# Patient Record
Sex: FEMALE | Race: OTHER | NOT HISPANIC OR LATINO
[De-identification: names, ages, dates, MRNs, and addresses within clinical notes are randomized per-mention and may not be internally consistent; named-entity substitution may affect disease eponyms.]

---

## 2017-11-09 ENCOUNTER — APPOINTMENT (OUTPATIENT)
Dept: OTOLARYNGOLOGY | Facility: CLINIC | Age: 52
End: 2017-11-09
Payer: COMMERCIAL

## 2017-11-09 VITALS
BODY MASS INDEX: 25.71 KG/M2 | HEART RATE: 78 BPM | WEIGHT: 160 LBS | SYSTOLIC BLOOD PRESSURE: 146 MMHG | HEIGHT: 66 IN | OXYGEN SATURATION: 97 % | DIASTOLIC BLOOD PRESSURE: 80 MMHG

## 2017-11-09 DIAGNOSIS — Z86.39 PERSONAL HISTORY OF OTHER ENDOCRINE, NUTRITIONAL AND METABOLIC DISEASE: ICD-10-CM

## 2017-11-09 DIAGNOSIS — Z82.49 FAMILY HISTORY OF ISCHEMIC HEART DISEASE AND OTHER DISEASES OF THE CIRCULATORY SYSTEM: ICD-10-CM

## 2017-11-09 DIAGNOSIS — Z78.9 OTHER SPECIFIED HEALTH STATUS: ICD-10-CM

## 2017-11-09 DIAGNOSIS — H92.09 OTALGIA, UNSPECIFIED EAR: ICD-10-CM

## 2017-11-09 PROCEDURE — 99203 OFFICE O/P NEW LOW 30 MIN: CPT

## 2017-11-14 ENCOUNTER — APPOINTMENT (OUTPATIENT)
Dept: OTOLARYNGOLOGY | Facility: CLINIC | Age: 52
End: 2017-11-14
Payer: COMMERCIAL

## 2017-11-14 VITALS — SYSTOLIC BLOOD PRESSURE: 145 MMHG | HEART RATE: 77 BPM | OXYGEN SATURATION: 98 % | DIASTOLIC BLOOD PRESSURE: 81 MMHG

## 2017-11-14 PROCEDURE — 99213 OFFICE O/P EST LOW 20 MIN: CPT

## 2018-01-24 ENCOUNTER — APPOINTMENT (OUTPATIENT)
Dept: OPHTHALMOLOGY | Facility: CLINIC | Age: 53
End: 2018-01-24
Payer: COMMERCIAL

## 2018-01-24 PROCEDURE — 92004 COMPRE OPH EXAM NEW PT 1/>: CPT

## 2018-01-24 PROCEDURE — 92225: CPT | Mod: RT

## 2019-01-23 ENCOUNTER — APPOINTMENT (OUTPATIENT)
Dept: OPHTHALMOLOGY | Facility: CLINIC | Age: 54
End: 2019-01-23
Payer: COMMERCIAL

## 2019-01-23 PROCEDURE — 92012 INTRM OPH EXAM EST PATIENT: CPT

## 2019-06-07 ENCOUNTER — TRANSCRIPTION ENCOUNTER (OUTPATIENT)
Age: 54
End: 2019-06-07

## 2019-06-14 ENCOUNTER — APPOINTMENT (OUTPATIENT)
Dept: OTOLARYNGOLOGY | Facility: CLINIC | Age: 54
End: 2019-06-14
Payer: COMMERCIAL

## 2019-06-14 VITALS
HEART RATE: 74 BPM | DIASTOLIC BLOOD PRESSURE: 97 MMHG | RESPIRATION RATE: 16 BRPM | SYSTOLIC BLOOD PRESSURE: 156 MMHG | TEMPERATURE: 98.3 F | OXYGEN SATURATION: 98 %

## 2019-06-14 PROCEDURE — 99213 OFFICE O/P EST LOW 20 MIN: CPT

## 2019-06-14 NOTE — HISTORY OF PRESENT ILLNESS
[de-identified] : 55 yo woman with h/o b swimmer's ear - this developed a week ago. it was severe - she went to urgent care and was given cortisporin and she feels significant improvement but still has mild pain l>r. no hl or tinnitus. no qtip use. nonsmoker.

## 2019-06-14 NOTE — PHYSICAL EXAM
[Normal] : external appearance is normal [de-identified] : resolving oe, no debris b,gently suctioned

## 2020-01-29 ENCOUNTER — NON-APPOINTMENT (OUTPATIENT)
Age: 55
End: 2020-01-29

## 2020-01-29 ENCOUNTER — APPOINTMENT (OUTPATIENT)
Dept: OPHTHALMOLOGY | Facility: CLINIC | Age: 55
End: 2020-01-29
Payer: COMMERCIAL

## 2020-01-29 PROCEDURE — 92083 EXTENDED VISUAL FIELD XM: CPT

## 2020-01-29 PROCEDURE — 92133 CPTRZD OPH DX IMG PST SGM ON: CPT

## 2020-01-29 PROCEDURE — 92014 COMPRE OPH EXAM EST PT 1/>: CPT

## 2020-07-28 ENCOUNTER — APPOINTMENT (OUTPATIENT)
Dept: OTOLARYNGOLOGY | Facility: CLINIC | Age: 55
End: 2020-07-28
Payer: COMMERCIAL

## 2020-07-28 VITALS
HEART RATE: 93 BPM | RESPIRATION RATE: 15 BRPM | OXYGEN SATURATION: 97 % | TEMPERATURE: 98.3 F | SYSTOLIC BLOOD PRESSURE: 165 MMHG | DIASTOLIC BLOOD PRESSURE: 99 MMHG

## 2020-07-28 DIAGNOSIS — H60.333 SWIMMER'S EAR, BILATERAL: ICD-10-CM

## 2020-07-28 PROCEDURE — 99214 OFFICE O/P EST MOD 30 MIN: CPT

## 2020-07-28 NOTE — HISTORY OF PRESENT ILLNESS
[de-identified] : 56 yo woman with h/o o.e. in the past c/o r>l otalgia, dull, not itchy for a few d getting worse. -f.s.c she also has h/o r tonsil larger than left that another ent noticed a few yrs ago, she said . Her throat has been getting increasing pain or a few days as well. no sob, no new cough but has had one in the past.

## 2020-08-06 ENCOUNTER — APPOINTMENT (OUTPATIENT)
Dept: OTOLARYNGOLOGY | Facility: CLINIC | Age: 55
End: 2020-08-06
Payer: COMMERCIAL

## 2020-08-06 VITALS
HEART RATE: 74 BPM | OXYGEN SATURATION: 97 % | DIASTOLIC BLOOD PRESSURE: 106 MMHG | SYSTOLIC BLOOD PRESSURE: 163 MMHG | TEMPERATURE: 98.4 F

## 2020-08-06 DIAGNOSIS — J03.90 ACUTE TONSILLITIS, UNSPECIFIED: ICD-10-CM

## 2020-08-06 PROCEDURE — 31575 DIAGNOSTIC LARYNGOSCOPY: CPT

## 2020-08-06 PROCEDURE — 99214 OFFICE O/P EST MOD 30 MIN: CPT | Mod: 25

## 2020-08-06 NOTE — ASSESSMENT
[FreeTextEntry1] : 55F who returns for sore throat and tonsillar hypertrophy, found to have RL on exam.\par \par Plan:\par - CT neck to r/o mass of tonsil\par - Rx Omeprazole\par - f/u 1 week\par - low acid diet modifications discussed- sheet given

## 2020-08-06 NOTE — HISTORY OF PRESENT ILLNESS
[de-identified] : 55F who returns for follow up of right tonsillitis/tonsillar hypertrophy and otalgia. She was prescribed Augmentin and Ciprodex drops with improvement, but not resolution of symptoms. She is now c/o throat irritation and mild dysphagia. she has a mild cough.and hoarseness.nonsmoker

## 2020-08-06 NOTE — REASON FOR VISIT
[Subsequent Evaluation] : a subsequent evaluation for [FreeTextEntry2] : R tonsillar hypertrophy, throat irritation

## 2020-08-06 NOTE — PROCEDURE
[Dysphagia] : dysphagia not clearly evaluated by indirect laryngoscopy [Unable to Cooperate with Mirror] : patient unable to cooperate with mirror [Serial Number: ___] : Serial Number: [unfilled] [Flexible Endoscope] : examined with the flexible endoscope [Interarytenoid Edema] : interarytenoid area edematous [Normal] : the false vocal folds were pink and regular, the ventricular sulcus was open, the true vocal folds were glistening white, tense and of equal length, mobility, and height [de-identified] : Procedure was explained to patient with all risks, benefits and alternatives, all questions answered. Patient was positioned sitting upright with chest out. Bilateral nasal passages inspected with no erythema, edema, polyps, masses, lesions or purulent drainage. Scope was advanced via right nasal passage to posterior nasopharynx where no masses, lesions or drainage was noted. Scope was then advanced to oropharynx where no masses, lesions or obstruction was noted. Scope was then advanced to hypopharynx/larynx where no asymmetry, masses, lesions, pooled secretions, vocal cord dysmotility or stenosis was noted. Patient tolerated procedure well.\par Significant interarytenoid and posterior cricoid edema and erythema consistent with reflux laryngitis.

## 2020-08-13 ENCOUNTER — APPOINTMENT (OUTPATIENT)
Dept: OTOLARYNGOLOGY | Facility: CLINIC | Age: 55
End: 2020-08-13
Payer: COMMERCIAL

## 2020-08-13 VITALS
RESPIRATION RATE: 17 BRPM | DIASTOLIC BLOOD PRESSURE: 110 MMHG | TEMPERATURE: 98.2 F | HEART RATE: 92 BPM | OXYGEN SATURATION: 99 % | SYSTOLIC BLOOD PRESSURE: 160 MMHG

## 2020-08-13 PROCEDURE — 99214 OFFICE O/P EST MOD 30 MIN: CPT

## 2020-08-13 NOTE — REASON FOR VISIT
[Subsequent Evaluation] : a subsequent evaluation for [FreeTextEntry2] : asymmetric tonsils, reflux laryngitis

## 2020-08-13 NOTE — DATA REVIEWED
[de-identified] : ct reviewed with pt and dr toscano- significant r tonsillar asymmetry; 1.8 cm thyroid nodule followed by Dr Cunningham (she said he had had bx)

## 2020-08-13 NOTE — HISTORY OF PRESENT ILLNESS
[de-identified] : followup 56 yo woman found to have r tonsil much larger than left and also reflux - treated with abx diet and mech precautions and omep. she feels about the same. had contrast rxn in radiology, controlled with benadryl. nonsmoker. sx are moderate,.of dysphagia

## 2020-08-18 ENCOUNTER — APPOINTMENT (OUTPATIENT)
Dept: OTOLARYNGOLOGY | Facility: CLINIC | Age: 55
End: 2020-08-18
Payer: COMMERCIAL

## 2020-08-18 VITALS
TEMPERATURE: 97.9 F | SYSTOLIC BLOOD PRESSURE: 170 MMHG | DIASTOLIC BLOOD PRESSURE: 98 MMHG | RESPIRATION RATE: 14 BRPM | BODY MASS INDEX: 25.71 KG/M2 | HEIGHT: 66 IN | HEART RATE: 85 BPM | OXYGEN SATURATION: 97 % | WEIGHT: 160 LBS

## 2020-08-18 DIAGNOSIS — S00.522A BLISTER (NONTHERMAL) OF ORAL CAVITY, INITIAL ENCOUNTER: ICD-10-CM

## 2020-08-18 PROCEDURE — 99214 OFFICE O/P EST MOD 30 MIN: CPT

## 2020-08-18 NOTE — HISTORY OF PRESENT ILLNESS
[de-identified] : followup 56 yo woman with asymmetric tonsils on o.r. scheduled for tonsillectomy next week. She called 3 d ago with blister in mouth. started on valtrex and she reports that itg is much better. -f.s.c

## 2020-08-18 NOTE — PHYSICAL EXAM
[de-identified] : asymmetric r>l [de-identified] : healing blister on roof of mouth [Normal] : no rashes

## 2020-08-23 ENCOUNTER — APPOINTMENT (OUTPATIENT)
Dept: DISASTER EMERGENCY | Facility: CLINIC | Age: 55
End: 2020-08-23

## 2020-09-06 ENCOUNTER — APPOINTMENT (OUTPATIENT)
Dept: DISASTER EMERGENCY | Facility: CLINIC | Age: 55
End: 2020-09-06

## 2020-09-07 LAB — SARS-COV-2 N GENE NPH QL NAA+PROBE: NOT DETECTED

## 2020-09-08 ENCOUNTER — TRANSCRIPTION ENCOUNTER (OUTPATIENT)
Age: 55
End: 2020-09-08

## 2020-09-09 ENCOUNTER — OUTPATIENT (OUTPATIENT)
Dept: OUTPATIENT SERVICES | Facility: HOSPITAL | Age: 55
LOS: 1 days | Discharge: HOME | End: 2020-09-09
Payer: COMMERCIAL

## 2020-09-09 ENCOUNTER — APPOINTMENT (OUTPATIENT)
Dept: OTOLARYNGOLOGY | Facility: AMBULATORY SURGERY CENTER | Age: 55
End: 2020-09-09

## 2020-09-09 ENCOUNTER — RESULT REVIEW (OUTPATIENT)
Age: 55
End: 2020-09-09

## 2020-09-09 DIAGNOSIS — Z98.89 OTHER SPECIFIED POSTPROCEDURAL STATES: Chronic | ICD-10-CM

## 2020-09-09 PROCEDURE — 42826 REMOVAL OF TONSILS: CPT | Mod: GC

## 2020-09-09 PROCEDURE — 88189 FLOWCYTOMETRY/READ 16 & >: CPT

## 2020-09-09 PROCEDURE — 88304 TISSUE EXAM BY PATHOLOGIST: CPT | Mod: 26

## 2020-09-14 ENCOUNTER — APPOINTMENT (OUTPATIENT)
Dept: OTOLARYNGOLOGY | Facility: CLINIC | Age: 55
End: 2020-09-14
Payer: COMMERCIAL

## 2020-09-14 VITALS
OXYGEN SATURATION: 95 % | SYSTOLIC BLOOD PRESSURE: 206 MMHG | HEART RATE: 81 BPM | TEMPERATURE: 98.3 F | DIASTOLIC BLOOD PRESSURE: 107 MMHG

## 2020-09-14 DIAGNOSIS — G89.18 OTHER ACUTE POSTPROCEDURAL PAIN: ICD-10-CM

## 2020-09-14 PROCEDURE — 99024 POSTOP FOLLOW-UP VISIT: CPT

## 2020-09-14 NOTE — HISTORY OF PRESENT ILLNESS
[de-identified] : 54 yo woman 5d s/p tonsillectomy is c/o severe b otalgia. She denies hl or vertigo. Has had no bleeding. Did not take her antihypertensives this am but can swallow her meds. She can take po liquids and soft foods. The otalgia has been present for her entire postop course.

## 2020-09-14 NOTE — PHYSICAL EXAM
[FreeTextEntry1] : in pain [de-identified] : b acute [Removed] : palatine tonsils previously removed [Normal] : no masses and lesions seen, face is symmetric [de-identified] : typical green white eschar in tonsillar beds. no erythema or blood. moist mucosa.

## 2020-09-15 ENCOUNTER — APPOINTMENT (OUTPATIENT)
Dept: OTOLARYNGOLOGY | Facility: CLINIC | Age: 55
End: 2020-09-15

## 2020-09-17 LAB — SURGICAL PATHOLOGY STUDY: SIGNIFICANT CHANGE UP

## 2020-09-18 ENCOUNTER — APPOINTMENT (OUTPATIENT)
Dept: OTOLARYNGOLOGY | Facility: CLINIC | Age: 55
End: 2020-09-18
Payer: COMMERCIAL

## 2020-09-18 VITALS
SYSTOLIC BLOOD PRESSURE: 132 MMHG | HEART RATE: 53 BPM | BODY MASS INDEX: 24.11 KG/M2 | DIASTOLIC BLOOD PRESSURE: 83 MMHG | HEIGHT: 66 IN | WEIGHT: 150 LBS | TEMPERATURE: 97.4 F | OXYGEN SATURATION: 97 %

## 2020-09-18 DIAGNOSIS — M62.838 OTHER MUSCLE SPASM: ICD-10-CM

## 2020-09-18 PROCEDURE — 99024 POSTOP FOLLOW-UP VISIT: CPT

## 2020-09-18 NOTE — PHYSICAL EXAM
[de-identified] : b spasm masseter muscles no crepitance, jaw still [de-identified] : tms normal [Removed] : palatine tonsils previously removed [de-identified] : scabs starting to come off - healing well [de-identified] : moist [Normal] : assessment of respiratory effort is normal

## 2020-09-18 NOTE — HISTORY OF PRESENT ILLNESS
[de-identified] : 9 d postop tonsillectomy.Throat pain improved but ear pain is massive. Denies hl. She explains she has h/o tmj in the past and cannot chew gum or same problem happens. She took percocet at 4 am for the ear pain as it woke her up but has had no pain med since.

## 2020-09-24 ENCOUNTER — APPOINTMENT (OUTPATIENT)
Dept: OTOLARYNGOLOGY | Facility: CLINIC | Age: 55
End: 2020-09-24
Payer: COMMERCIAL

## 2020-09-24 VITALS
RESPIRATION RATE: 98 BRPM | TEMPERATURE: 97.2 F | SYSTOLIC BLOOD PRESSURE: 139 MMHG | OXYGEN SATURATION: 98 % | DIASTOLIC BLOOD PRESSURE: 86 MMHG

## 2020-09-24 DIAGNOSIS — J35.8 OTHER CHRONIC DISEASES OF TONSILS AND ADENOIDS: ICD-10-CM

## 2020-09-24 PROCEDURE — 99024 POSTOP FOLLOW-UP VISIT: CPT

## 2020-09-24 NOTE — PHYSICAL EXAM
[Normal] : mucosa is normal [Midline] : trachea located in midline position [de-identified] : R TM with monomer, otherwise normal [de-identified] : healing palatine tonsil bed

## 2020-09-24 NOTE — REASON FOR VISIT
[Post-Operative Visit] : a post-operative visit [FreeTextEntry2] : postop [FreeTextEntry1] : tonsillectomy

## 2020-09-24 NOTE — ASSESSMENT
[FreeTextEntry1] : 55F who is POD 15 from tonsillectomy for asymmetric tonsils. She is now with improved pain and good PO intake.\par \par Plan:\par - continue pain medications and cyclobenzaprine PRN\par - f/u in 2 weeks\par - continue adequate PO intake and hydration

## 2020-09-24 NOTE — HISTORY OF PRESENT ILLNESS
[de-identified] : pod 15 tonsillectomy - she had discomfort from tmj which has improved with muscle relaxants - she is no longer needing them - throat pain also improved and she is eating a more normal diet\par path benign\par exam - tms eddie, tmjs less inflamed, better excursion, tonsil beds healing well\par reassured rec - continue to increase diet\par rtc 2 weeks

## 2020-09-24 NOTE — HISTORY OF PRESENT ILLNESS
[de-identified] : pod 15 tonsillectomy - she had discomfort from tmj which has improved with muscle relaxants - she is no longer needing them - throat pain also improved and she is eating a more normal diet\par path benign\par exam - tms eddie, tmjs less inflamed, better excursion, tonsil beds healing well\par reassured rec - continue to increase diet\par rtc 2 weeks

## 2020-09-24 NOTE — PHYSICAL EXAM
[Normal] : mucosa is normal [Midline] : trachea located in midline position [de-identified] : R TM with monomer, otherwise normal [de-identified] : healing palatine tonsil bed

## 2020-10-09 ENCOUNTER — APPOINTMENT (OUTPATIENT)
Dept: OTOLARYNGOLOGY | Facility: CLINIC | Age: 55
End: 2020-10-09
Payer: COMMERCIAL

## 2020-10-09 VITALS
SYSTOLIC BLOOD PRESSURE: 160 MMHG | HEART RATE: 71 BPM | DIASTOLIC BLOOD PRESSURE: 88 MMHG | TEMPERATURE: 98.7 F | OXYGEN SATURATION: 97 % | RESPIRATION RATE: 17 BRPM

## 2020-10-09 PROCEDURE — 99024 POSTOP FOLLOW-UP VISIT: CPT

## 2020-10-09 NOTE — PHYSICAL EXAM
[Removed] : palatine tonsils previously removed [Laryngoscopy Performed] : laryngoscopy was performed, see procedure section for findings [Normal] : no rashes [de-identified] : almost completely healed

## 2020-10-09 NOTE — PROCEDURE
[Complicated Symptoms] : complicated symptoms requiring more thorough examination than provided by mirror [Flexible Endoscope] : examined with the flexible endoscope [Normal] : the false vocal folds were pink and regular, the ventricular sulcus was open, the true vocal folds were glistening white, tense and of equal length, mobility, and height [Interarytenoid Edema] : interarytenoid area edematous [Serial Number: ___] : Serial Number: [unfilled] [de-identified] : done for fullness when she swallows - mild iah and bot irritation cw reflux\par postop inflammation

## 2020-10-09 NOTE — HISTORY OF PRESENT ILLNESS
[de-identified] : 56 yo woman 1 mo postop tonsillectomy feels pain in her throat and ears is much better but she still has some fullness in the back of her throat when she swallows. -vikassAugustinac

## 2020-11-13 ENCOUNTER — APPOINTMENT (OUTPATIENT)
Dept: OTOLARYNGOLOGY | Facility: CLINIC | Age: 55
End: 2020-11-13
Payer: COMMERCIAL

## 2020-11-13 VITALS
TEMPERATURE: 98 F | HEART RATE: 66 BPM | SYSTOLIC BLOOD PRESSURE: 171 MMHG | DIASTOLIC BLOOD PRESSURE: 93 MMHG | OXYGEN SATURATION: 98 %

## 2020-11-13 PROCEDURE — 99213 OFFICE O/P EST LOW 20 MIN: CPT | Mod: 24

## 2020-11-13 PROCEDURE — 99072 ADDL SUPL MATRL&STAF TM PHE: CPT

## 2020-11-13 NOTE — PHYSICAL EXAM
[de-identified] : l normal; r scant semisolid debris, yellow suctioned, tm intact looks fungal, tm intact [Normal] : no masses and lesions seen, face is symmetric

## 2020-11-13 NOTE — HISTORY OF PRESENT ILLNESS
[de-identified] : 56 yo woman who had tonsillectomy over 2 mo ago got water in r ear a week ago and has had 5 d of progressive r otalgia and fullness. she has tried ciprodex but has had no improvement. -f.s.c mouth feels fine.

## 2020-11-20 ENCOUNTER — APPOINTMENT (OUTPATIENT)
Dept: OTOLARYNGOLOGY | Facility: CLINIC | Age: 55
End: 2020-11-20
Payer: COMMERCIAL

## 2020-11-20 ENCOUNTER — NON-APPOINTMENT (OUTPATIENT)
Age: 55
End: 2020-11-20

## 2020-11-20 VITALS
SYSTOLIC BLOOD PRESSURE: 170 MMHG | HEART RATE: 70 BPM | OXYGEN SATURATION: 97 % | TEMPERATURE: 96.8 F | DIASTOLIC BLOOD PRESSURE: 98 MMHG

## 2020-11-20 PROCEDURE — 99213 OFFICE O/P EST LOW 20 MIN: CPT | Mod: 24

## 2020-11-20 NOTE — PHYSICAL EXAM
[Normal] : tympanic membranes are normal in both ears [de-identified] : l ok; r improved but stil some white/clear semisolid matl suctioned - tm nl mild inflammation [de-identified] : gait steady

## 2020-11-20 NOTE — HISTORY OF PRESENT ILLNESS
[de-identified] : followup 54 yo woman s/p tonsillectomy approx 3 mo ago found to have otomycosis last visit 1 week ago - she is on vosol and feels much improved but still has some drainage. -fAugustinasAugustinac

## 2020-12-02 ENCOUNTER — LABORATORY RESULT (OUTPATIENT)
Age: 55
End: 2020-12-02

## 2020-12-02 ENCOUNTER — APPOINTMENT (OUTPATIENT)
Dept: OTOLARYNGOLOGY | Facility: CLINIC | Age: 55
End: 2020-12-02
Payer: COMMERCIAL

## 2020-12-02 VITALS
RESPIRATION RATE: 14 BRPM | HEART RATE: 66 BPM | SYSTOLIC BLOOD PRESSURE: 171 MMHG | DIASTOLIC BLOOD PRESSURE: 91 MMHG | TEMPERATURE: 98.3 F | OXYGEN SATURATION: 98 %

## 2020-12-02 PROCEDURE — 99214 OFFICE O/P EST MOD 30 MIN: CPT | Mod: 24

## 2020-12-02 PROCEDURE — 99072 ADDL SUPL MATRL&STAF TM PHE: CPT

## 2020-12-02 PROCEDURE — 92550 TYMPANOMETRY & REFLEX THRESH: CPT

## 2020-12-02 PROCEDURE — 92557 COMPREHENSIVE HEARING TEST: CPT

## 2020-12-02 NOTE — PHYSICAL EXAM
[de-identified] : r yellow debris suctioned atraumatically/little induration/no purulence/ps perf seen - cultured; l normal [Normal] : no rashes

## 2020-12-02 NOTE — HISTORY OF PRESENT ILLNESS
[de-identified] : 56 yo woman seen 12 d ago with r mild otomycosis and started on acetic acid drops could not followup till this week - she reports worsened r ear pain with drops ofor the past 3 days and poor r hearing. -f/.s/.c. She is keeping it dry. No qtips.

## 2020-12-09 ENCOUNTER — APPOINTMENT (OUTPATIENT)
Dept: OTOLARYNGOLOGY | Facility: CLINIC | Age: 55
End: 2020-12-09
Payer: COMMERCIAL

## 2020-12-09 VITALS
SYSTOLIC BLOOD PRESSURE: 149 MMHG | TEMPERATURE: 97.2 F | DIASTOLIC BLOOD PRESSURE: 88 MMHG | HEART RATE: 78 BPM | OXYGEN SATURATION: 98 %

## 2020-12-09 DIAGNOSIS — H62.41 SUPERFICIAL MYCOSIS, UNSPECIFIED: ICD-10-CM

## 2020-12-09 DIAGNOSIS — B36.9 SUPERFICIAL MYCOSIS, UNSPECIFIED: ICD-10-CM

## 2020-12-09 PROCEDURE — 99072 ADDL SUPL MATRL&STAF TM PHE: CPT

## 2020-12-09 PROCEDURE — 99213 OFFICE O/P EST LOW 20 MIN: CPT

## 2020-12-09 NOTE — PHYSICAL EXAM
[de-identified] : l nl; r persistent perf, barely any white yellow drainage recultured under microscope [Normal] : no masses and lesions seen, face is symmetric [de-identified] : gait steady

## 2020-12-09 NOTE — HISTORY OF PRESENT ILLNESS
[de-identified] : Followup 56 yo woman with r probable otomycosis and ear drainage. Culture has grown only s.epi. On bactrim, voriconazole she has less pain and drainage but hearing is still poor in the ear. -f.s.c She never got clotrimazole drops because pharmacy did not have them. Denies vertigo. She feels hearing has not changed from last week.

## 2020-12-18 ENCOUNTER — APPOINTMENT (OUTPATIENT)
Dept: OTOLARYNGOLOGY | Facility: CLINIC | Age: 55
End: 2020-12-18
Payer: COMMERCIAL

## 2020-12-18 VITALS
OXYGEN SATURATION: 98 % | SYSTOLIC BLOOD PRESSURE: 162 MMHG | HEART RATE: 78 BPM | DIASTOLIC BLOOD PRESSURE: 97 MMHG | TEMPERATURE: 98.1 F

## 2020-12-18 DIAGNOSIS — B36.9 SUPERFICIAL MYCOSIS, UNSPECIFIED: ICD-10-CM

## 2020-12-18 DIAGNOSIS — H62.41 SUPERFICIAL MYCOSIS, UNSPECIFIED: ICD-10-CM

## 2020-12-18 LAB — BACTERIA FLD CULT: ABNORMAL

## 2020-12-18 PROCEDURE — 99072 ADDL SUPL MATRL&STAF TM PHE: CPT

## 2020-12-18 PROCEDURE — 99213 OFFICE O/P EST LOW 20 MIN: CPT

## 2020-12-18 NOTE — ASSESSMENT
[FreeTextEntry1] : cold sores of lips- resolved on valtrex\par \par otomycosis - resolved\par \par tm perf - persistent - continue to observe for healing\par dep\par rtc 3-4 weeks\par she understands surgery to repair this may be recommended if it doesn’t heal

## 2020-12-18 NOTE — HISTORY OF PRESENT ILLNESS
[de-identified] : followup r otomycosis - on voriconazole, bactrim and clotrimazole drops she feels no more pain and minimal drainage. -f.s.c hearing is still poor - she feels sh has moderate hearing loss. She has almost finished her meds.

## 2020-12-23 LAB — SARS-COV-2 N GENE NPH QL NAA+PROBE: NOT DETECTED

## 2021-01-08 ENCOUNTER — APPOINTMENT (OUTPATIENT)
Dept: OTOLARYNGOLOGY | Facility: CLINIC | Age: 56
End: 2021-01-08
Payer: COMMERCIAL

## 2021-01-08 VITALS
HEART RATE: 69 BPM | SYSTOLIC BLOOD PRESSURE: 131 MMHG | OXYGEN SATURATION: 97 % | TEMPERATURE: 98 F | DIASTOLIC BLOOD PRESSURE: 81 MMHG

## 2021-01-08 PROCEDURE — 99072 ADDL SUPL MATRL&STAF TM PHE: CPT

## 2021-01-08 PROCEDURE — 99214 OFFICE O/P EST MOD 30 MIN: CPT

## 2021-01-08 NOTE — HISTORY OF PRESENT ILLNESS
[de-identified] : followup 56 yo woman with h/o r otomycosis and resultant tm perforation. Infection has resolved for over 3 weeks and she is here to recheck her ear to see if tm perforation has improved or resolved. She c/o continued r aural fullness and subjective severe hl without pain or drainage.

## 2021-01-15 ENCOUNTER — APPOINTMENT (OUTPATIENT)
Dept: OTOLARYNGOLOGY | Facility: CLINIC | Age: 56
End: 2021-01-15
Payer: COMMERCIAL

## 2021-01-15 VITALS
DIASTOLIC BLOOD PRESSURE: 83 MMHG | HEART RATE: 71 BPM | OXYGEN SATURATION: 98 % | SYSTOLIC BLOOD PRESSURE: 147 MMHG | TEMPERATURE: 97.3 F

## 2021-01-15 DIAGNOSIS — H72.01 CENTRAL PERFORATION OF TYMPANIC MEMBRANE, RIGHT EAR: ICD-10-CM

## 2021-01-15 DIAGNOSIS — H90.71 MIXED CONDUCTIVE AND SENSORINEURAL HEARING LOSS, UNILATERAL, RIGHT EAR, WITH UNRESTRICTED HEARING ON THE CONTRALATERAL SIDE: ICD-10-CM

## 2021-01-15 PROCEDURE — 99072 ADDL SUPL MATRL&STAF TM PHE: CPT

## 2021-01-15 PROCEDURE — 99214 OFFICE O/P EST MOD 30 MIN: CPT

## 2021-01-15 NOTE — DATA REVIEWED
[de-identified] :  reviewed with pt from last mo r mhl almost all conductive asymmetry [de-identified] : ct images and report reviewed with pt - clear x tmj and mild sinus congestion

## 2021-01-15 NOTE — HISTORY OF PRESENT ILLNESS
[de-identified] : followup 56 yo woman with r tm perforation- she reports that her hearing is still decreased. Denies pain or drainage. Occurred with otomycosis. Has been present without infection for just under a month. She has had ct and is here to review it together.

## 2021-01-15 NOTE — ASSESSMENT
[FreeTextEntry1] : r tm perf and chl aftr otomycosis\par discussed risks benefits and alts to r tympanoplasty\par she wished to proceed\par brought to asst to schedule surgery

## 2021-01-29 ENCOUNTER — LABORATORY RESULT (OUTPATIENT)
Age: 56
End: 2021-01-29

## 2021-01-30 ENCOUNTER — APPOINTMENT (OUTPATIENT)
Dept: DISASTER EMERGENCY | Facility: CLINIC | Age: 56
End: 2021-01-30

## 2021-01-31 LAB — SARS-COV-2 N GENE NPH QL NAA+PROBE: NOT DETECTED

## 2021-02-01 ENCOUNTER — TRANSCRIPTION ENCOUNTER (OUTPATIENT)
Age: 56
End: 2021-02-01

## 2021-02-02 ENCOUNTER — APPOINTMENT (OUTPATIENT)
Dept: OTOLARYNGOLOGY | Facility: AMBULATORY SURGERY CENTER | Age: 56
End: 2021-02-02

## 2021-02-02 ENCOUNTER — OUTPATIENT (OUTPATIENT)
Dept: OUTPATIENT SERVICES | Facility: HOSPITAL | Age: 56
LOS: 1 days | Discharge: HOME | End: 2021-02-02
Payer: COMMERCIAL

## 2021-02-02 DIAGNOSIS — Z98.89 OTHER SPECIFIED POSTPROCEDURAL STATES: Chronic | ICD-10-CM

## 2021-02-02 PROCEDURE — 69631 REPAIR EARDRUM STRUCTURES: CPT | Mod: RT

## 2021-02-04 ENCOUNTER — APPOINTMENT (OUTPATIENT)
Dept: OTOLARYNGOLOGY | Facility: CLINIC | Age: 56
End: 2021-02-04
Payer: COMMERCIAL

## 2021-02-04 VITALS
OXYGEN SATURATION: 98 % | TEMPERATURE: 97.6 F | RESPIRATION RATE: 15 BRPM | DIASTOLIC BLOOD PRESSURE: 91 MMHG | SYSTOLIC BLOOD PRESSURE: 187 MMHG | HEART RATE: 69 BPM

## 2021-02-04 PROCEDURE — 99024 POSTOP FOLLOW-UP VISIT: CPT

## 2021-02-04 NOTE — HISTORY OF PRESENT ILLNESS
[de-identified] : 56 yo woman 2 d postop r tympanoplasty with postauricular incision. She had mastoid dsg placed at the time of surgery due to mild oozing and is here for removal\par Aside from pressure from the dressing she has no complaints\par dressing removed - forehead fine - incision clean and dry no hematoma  or drainage\par eac checked ointment in canal\par doing well\par cotton ball placed\par postop instructions reviewed\par gait steady, no nystagmus facial function normal\par rtc 5 d to check ear

## 2021-02-10 ENCOUNTER — APPOINTMENT (OUTPATIENT)
Dept: OTOLARYNGOLOGY | Facility: CLINIC | Age: 56
End: 2021-02-10
Payer: COMMERCIAL

## 2021-02-10 VITALS
OXYGEN SATURATION: 98 % | TEMPERATURE: 98.3 F | SYSTOLIC BLOOD PRESSURE: 166 MMHG | HEART RATE: 69 BPM | DIASTOLIC BLOOD PRESSURE: 88 MMHG

## 2021-02-10 PROCEDURE — 99024 POSTOP FOLLOW-UP VISIT: CPT

## 2021-02-10 NOTE — PHYSICAL EXAM
[Normal] : normal appearance, well groomed, well nourished, and in no acute distress [de-identified] : b [de-identified] : r lateral clot in canal removed with suction; l normal

## 2021-02-10 NOTE — HISTORY OF PRESENT ILLNESS
[de-identified] : 57 yo F pod 8 from r tympanoplasty c/o sharp r>l preauricular pain and ear numbness. No significant bleeding ear still feels plugged. She has tmj and is using mouth guard but it hurts to open mouth.

## 2021-02-12 ENCOUNTER — APPOINTMENT (OUTPATIENT)
Dept: OTOLARYNGOLOGY | Facility: CLINIC | Age: 56
End: 2021-02-12
Payer: COMMERCIAL

## 2021-02-12 VITALS
SYSTOLIC BLOOD PRESSURE: 174 MMHG | TEMPERATURE: 98 F | DIASTOLIC BLOOD PRESSURE: 94 MMHG | OXYGEN SATURATION: 97 % | HEART RATE: 63 BPM

## 2021-02-12 PROCEDURE — 99024 POSTOP FOLLOW-UP VISIT: CPT

## 2021-02-12 NOTE — HISTORY OF PRESENT ILLNESS
[de-identified] : followup 57 yo woman 10 d s/p r tympanoplasty c/o severe sharp r preauricular pain aggravated by opening and closing mouth. denies drainage. was here last 2 d ago - this was happening then and thought to be tmj. -stephaniac

## 2021-02-12 NOTE — ASSESSMENT
[FreeTextEntry1] : severe otalgia\par looks like combination of tmj and recent surgery on the same side\par I recommended soft food avoid bruxism she is using appliance - motrin\par she requested vicodin rf - I ordered 8 tabs (after checking Istop) but told her motriin should work better and explained if she needs more I would like to involve pain mgmt\par rtc 2 weeks\par asked to call sooner for any problems

## 2021-02-12 NOTE — PHYSICAL EXAM
[de-identified] : r>>l [de-identified] : l normal; r eac open tm intact not inflmaed healing well -  [Normal] : no rashes

## 2021-02-23 ENCOUNTER — APPOINTMENT (OUTPATIENT)
Dept: OTOLARYNGOLOGY | Facility: CLINIC | Age: 56
End: 2021-02-23
Payer: COMMERCIAL

## 2021-02-23 VITALS
TEMPERATURE: 97.5 F | BODY MASS INDEX: 24.91 KG/M2 | HEART RATE: 67 BPM | HEIGHT: 66 IN | OXYGEN SATURATION: 97 % | WEIGHT: 155 LBS | SYSTOLIC BLOOD PRESSURE: 157 MMHG | DIASTOLIC BLOOD PRESSURE: 87 MMHG

## 2021-02-23 PROCEDURE — 92557 COMPREHENSIVE HEARING TEST: CPT

## 2021-02-23 PROCEDURE — 99024 POSTOP FOLLOW-UP VISIT: CPT

## 2021-02-23 PROCEDURE — 99072 ADDL SUPL MATRL&STAF TM PHE: CPT

## 2021-02-23 NOTE — HISTORY OF PRESENT ILLNESS
[de-identified] : 55 yo woman 3 weeks postop r tympanoplasty\par pain better\par she is unsure of her hearing\par healing well\par tmj better\par scant crusts removed atraumatically with suction and alligator forceps under microscope\par tm well healed\par  huge improvement\par reassured\par recheck 1 mo\par full activity\par questions answered

## 2021-03-23 ENCOUNTER — APPOINTMENT (OUTPATIENT)
Dept: OTOLARYNGOLOGY | Facility: CLINIC | Age: 56
End: 2021-03-23
Payer: COMMERCIAL

## 2021-03-23 VITALS
DIASTOLIC BLOOD PRESSURE: 95 MMHG | HEART RATE: 76 BPM | OXYGEN SATURATION: 98 % | SYSTOLIC BLOOD PRESSURE: 164 MMHG | TEMPERATURE: 98 F

## 2021-03-23 PROCEDURE — 99024 POSTOP FOLLOW-UP VISIT: CPT

## 2021-03-23 NOTE — ASSESSMENT
[FreeTextEntry1] : reassured her exam is normal\par the numbness she c/o seems more like some persistent taste change. I explained the chances are excellent this will resolve or get much better and reassured her. she is not biting anika and feels tongue blade\par nasal and anterior throat exams nl as well\par reassured\par rtc as needed

## 2021-03-23 NOTE — HISTORY OF PRESENT ILLNESS
[de-identified] : 57 yo woman approx 6 weeks s/p r tymp and also s/p tonsillectomy last yr feels better. throat not bothering her and neither is ear. Hearing is good. She c/o some persistent "numbness" to r tongue but it is improved\par

## 2021-05-05 ENCOUNTER — APPOINTMENT (OUTPATIENT)
Dept: OTOLARYNGOLOGY | Facility: CLINIC | Age: 56
End: 2021-05-05
Payer: COMMERCIAL

## 2021-05-05 VITALS
SYSTOLIC BLOOD PRESSURE: 167 MMHG | DIASTOLIC BLOOD PRESSURE: 83 MMHG | TEMPERATURE: 97.4 F | HEART RATE: 91 BPM | RESPIRATION RATE: 17 BRPM | OXYGEN SATURATION: 100 %

## 2021-05-05 DIAGNOSIS — H69.80 OTHER SPECIFIED DISORDERS OF EUSTACHIAN TUBE, UNSPECIFIED EAR: ICD-10-CM

## 2021-05-05 DIAGNOSIS — M54.2 CERVICALGIA: ICD-10-CM

## 2021-05-05 PROCEDURE — 99213 OFFICE O/P EST LOW 20 MIN: CPT | Mod: 25

## 2021-05-05 PROCEDURE — 99072 ADDL SUPL MATRL&STAF TM PHE: CPT

## 2021-05-05 PROCEDURE — 31231 NASAL ENDOSCOPY DX: CPT

## 2021-05-05 PROCEDURE — 69210 REMOVE IMPACTED EAR WAX UNI: CPT

## 2021-05-05 NOTE — ASSESSMENT
[FreeTextEntry1] : cerumen rmeoved \par fullness improved but pain persisted\par she has tmj noticeable\par scope clear\par soft diet nsaids if safe, see her dentist for mouth guard, avoid bruxism\par rtc as needed

## 2021-05-05 NOTE — HISTORY OF PRESENT ILLNESS
[de-identified] : 57 yo woman s/p r tympanoplasty over 3 mo ago and tonsillectomy last yr has had a few d of r otalgia after her second COVID 19 vaccine- it radiates to the r side of her neck and she reports r hl. denies vertigo or tinnitus. denies hl. nonsmoker.

## 2021-05-05 NOTE — PHYSICAL EXAM
[de-identified] : b [de-identified] : r copious cerumen imapction remvoed atraumatically with suction - tm normal; l normal [Normal] : no rashes [de-identified] : gait steady

## 2021-05-05 NOTE — PROCEDURE
[Flexible Endoscope] : examined with the flexible endoscope [Serial Number: ___] : Serial Number: [unfilled] [Normal] : the paranasal sinuses had no abnormalities [Cerumen Impaction] : Cerumen Impaction [Same] : same as the Pre Op Dx. [] : Removal of Cerumen [FreeTextEntry6] : r copious cerumen impaction removed atraumatically with suction

## 2021-05-19 ENCOUNTER — NON-APPOINTMENT (OUTPATIENT)
Age: 56
End: 2021-05-19

## 2021-05-19 ENCOUNTER — APPOINTMENT (OUTPATIENT)
Dept: OPHTHALMOLOGY | Facility: CLINIC | Age: 56
End: 2021-05-19
Payer: COMMERCIAL

## 2021-05-19 PROCEDURE — 92083 EXTENDED VISUAL FIELD XM: CPT

## 2021-05-19 PROCEDURE — 92133 CPTRZD OPH DX IMG PST SGM ON: CPT

## 2021-05-19 PROCEDURE — 99072 ADDL SUPL MATRL&STAF TM PHE: CPT

## 2021-05-19 PROCEDURE — 92014 COMPRE OPH EXAM EST PT 1/>: CPT

## 2022-04-14 ENCOUNTER — APPOINTMENT (OUTPATIENT)
Dept: OTOLARYNGOLOGY | Facility: CLINIC | Age: 57
End: 2022-04-14
Payer: COMMERCIAL

## 2022-04-14 VITALS
HEIGHT: 66 IN | DIASTOLIC BLOOD PRESSURE: 75 MMHG | BODY MASS INDEX: 24.91 KG/M2 | RESPIRATION RATE: 16 BRPM | OXYGEN SATURATION: 99 % | HEART RATE: 60 BPM | SYSTOLIC BLOOD PRESSURE: 168 MMHG | TEMPERATURE: 97.6 F | WEIGHT: 155 LBS

## 2022-04-14 DIAGNOSIS — H93.90 UNSPECIFIED DISORDER OF EAR, UNSPECIFIED EAR: ICD-10-CM

## 2022-04-14 DIAGNOSIS — L30.9 DERMATITIS, UNSPECIFIED: ICD-10-CM

## 2022-04-14 DIAGNOSIS — H61.21 IMPACTED CERUMEN, RIGHT EAR: ICD-10-CM

## 2022-04-14 PROCEDURE — 99213 OFFICE O/P EST LOW 20 MIN: CPT | Mod: 25

## 2022-04-14 PROCEDURE — 69210 REMOVE IMPACTED EAR WAX UNI: CPT

## 2022-04-14 NOTE — PROCEDURE
[Cerumen Impaction] : Cerumen Impaction [Same] : same as the Pre Op Dx. [] : Removal of Cerumen [FreeTextEntry5] : L ok, R copious cerumen removed atraumatically with suction, TMs intact

## 2022-04-14 NOTE — PHYSICAL EXAM
[de-identified] : R postauricular crusting around surgical scarcleaned wih alcohol [de-identified] : L ok, R copious cerumen removed atraumatically with suction, TMs intact  [Normal] : mucosa is normal [Removed] : palatine tonsils previously removed [de-identified] : gait steady

## 2022-04-14 NOTE — ASSESSMENT
[FreeTextEntry1] : 1. R postauricular dermatitis likely from mask and glasses; yellow discharge\par -cleaned with alcohol swab and put bacitracin over area\par -Bactrim- counseled on risk of sun sensitivity/rash and asked to stop taking immediately if this develops- pt understands \par -Mupirocin TID- instructed how to use\par -given tie around masks to avoid irritation\par -rec followup with ophthalmologist to check eyes and for patient to keep glasses off for next 2 weeks\par 2. R cerumen impaction \par -cerumen removed\par -ears feel better\par -Avoid Qtip usage\par RTC 2 weeks to recheck postauricular area

## 2022-04-14 NOTE — REASON FOR VISIT
[Subsequent Evaluation] : a subsequent evaluation for [FreeTextEntry2] : otalgia, aural fullness, postauricular discomfort

## 2022-04-14 NOTE — HISTORY OF PRESENT ILLNESS
[de-identified] : 11 month followup for this 58 y/o F s/p r tympanoplasty over 1 year ago presenting today with R aural fullness. She feels it is "achey" and clogged for the past 2 weeks. It has been intermittently painful, about a 6-7/10 in severity at its worst. It is more bothersome when she lays on R side and she has woken up from sleep due to this. She does have h/o TMJ. She wears a mouth guard at night. For the past two days she notices the back of her ear is wet and itchy. Denies fevers, chills, sweats. No other ENT complaints at this time.

## 2022-05-18 ENCOUNTER — NON-APPOINTMENT (OUTPATIENT)
Age: 57
End: 2022-05-18

## 2022-05-18 ENCOUNTER — APPOINTMENT (OUTPATIENT)
Dept: OPHTHALMOLOGY | Facility: CLINIC | Age: 57
End: 2022-05-18
Payer: COMMERCIAL

## 2022-05-18 PROCEDURE — 92012 INTRM OPH EXAM EST PATIENT: CPT

## 2023-05-24 ENCOUNTER — NON-APPOINTMENT (OUTPATIENT)
Age: 58
End: 2023-05-24

## 2023-05-24 ENCOUNTER — APPOINTMENT (OUTPATIENT)
Dept: OPHTHALMOLOGY | Facility: CLINIC | Age: 58
End: 2023-05-24
Payer: COMMERCIAL

## 2023-05-24 PROCEDURE — 92083 EXTENDED VISUAL FIELD XM: CPT

## 2023-05-24 PROCEDURE — 92133 CPTRZD OPH DX IMG PST SGM ON: CPT

## 2023-05-24 PROCEDURE — 92012 INTRM OPH EXAM EST PATIENT: CPT

## 2023-06-29 ENCOUNTER — NON-APPOINTMENT (OUTPATIENT)
Age: 58
End: 2023-06-29

## 2023-06-29 ENCOUNTER — APPOINTMENT (OUTPATIENT)
Dept: OTOLARYNGOLOGY | Facility: CLINIC | Age: 58
End: 2023-06-29
Payer: COMMERCIAL

## 2023-06-29 ENCOUNTER — RX RENEWAL (OUTPATIENT)
Age: 58
End: 2023-06-29

## 2023-06-29 ENCOUNTER — APPOINTMENT (OUTPATIENT)
Dept: INTERNAL MEDICINE | Facility: CLINIC | Age: 58
End: 2023-06-29
Payer: COMMERCIAL

## 2023-06-29 VITALS
OXYGEN SATURATION: 98 % | DIASTOLIC BLOOD PRESSURE: 75 MMHG | SYSTOLIC BLOOD PRESSURE: 155 MMHG | BODY MASS INDEX: 29.57 KG/M2 | HEIGHT: 66 IN | HEART RATE: 58 BPM | WEIGHT: 184 LBS | TEMPERATURE: 97.6 F

## 2023-06-29 VITALS
HEART RATE: 67 BPM | HEIGHT: 66 IN | OXYGEN SATURATION: 98 % | DIASTOLIC BLOOD PRESSURE: 85 MMHG | WEIGHT: 182 LBS | TEMPERATURE: 98.2 F | RESPIRATION RATE: 16 BRPM | BODY MASS INDEX: 29.25 KG/M2 | SYSTOLIC BLOOD PRESSURE: 170 MMHG

## 2023-06-29 DIAGNOSIS — H92.01 OTALGIA, RIGHT EAR: ICD-10-CM

## 2023-06-29 DIAGNOSIS — Z09 ENCOUNTER FOR FOLLOW-UP EXAMINATION AFTER COMPLETED TREATMENT FOR CONDITIONS OTHER THAN MALIGNANT NEOPLASM: ICD-10-CM

## 2023-06-29 DIAGNOSIS — Z80.3 FAMILY HISTORY OF MALIGNANT NEOPLASM OF BREAST: ICD-10-CM

## 2023-06-29 DIAGNOSIS — J03.90 ACUTE TONSILLITIS, UNSPECIFIED: ICD-10-CM

## 2023-06-29 DIAGNOSIS — R35.0 FREQUENCY OF MICTURITION: ICD-10-CM

## 2023-06-29 DIAGNOSIS — R00.2 PALPITATIONS: ICD-10-CM

## 2023-06-29 DIAGNOSIS — H60.331 SWIMMER'S EAR, RIGHT EAR: ICD-10-CM

## 2023-06-29 DIAGNOSIS — Z01.818 ENCOUNTER FOR OTHER PREPROCEDURAL EXAMINATION: ICD-10-CM

## 2023-06-29 DIAGNOSIS — K21.9 ACUTE LARYNGITIS: ICD-10-CM

## 2023-06-29 DIAGNOSIS — H61.23 IMPACTED CERUMEN, BILATERAL: ICD-10-CM

## 2023-06-29 DIAGNOSIS — Z00.00 ENCOUNTER FOR GENERAL ADULT MEDICAL EXAMINATION W/OUT ABNORMAL FINDINGS: ICD-10-CM

## 2023-06-29 DIAGNOSIS — N80.9 ENDOMETRIOSIS, UNSPECIFIED: ICD-10-CM

## 2023-06-29 DIAGNOSIS — R49.8 OTHER VOICE AND RESONANCE DISORDERS: ICD-10-CM

## 2023-06-29 DIAGNOSIS — M54.2 CERVICALGIA: ICD-10-CM

## 2023-06-29 DIAGNOSIS — J04.0 ACUTE LARYNGITIS: ICD-10-CM

## 2023-06-29 PROCEDURE — 99213 OFFICE O/P EST LOW 20 MIN: CPT | Mod: 25

## 2023-06-29 PROCEDURE — 31575 DIAGNOSTIC LARYNGOSCOPY: CPT

## 2023-06-29 PROCEDURE — 69210 REMOVE IMPACTED EAR WAX UNI: CPT

## 2023-06-29 PROCEDURE — 36415 COLL VENOUS BLD VENIPUNCTURE: CPT

## 2023-06-29 PROCEDURE — 99386 PREV VISIT NEW AGE 40-64: CPT | Mod: 25

## 2023-06-29 RX ORDER — DOCUSATE SODIUM 100 MG/1
100 CAPSULE, LIQUID FILLED ORAL TWICE DAILY
Qty: 42 | Refills: 0 | Status: COMPLETED | COMMUNITY
Start: 2021-01-31 | End: 2023-06-29

## 2023-06-29 RX ORDER — CEFUROXIME AXETIL 500 MG/1
500 TABLET ORAL
Qty: 14 | Refills: 0 | Status: COMPLETED | COMMUNITY
Start: 2021-01-31 | End: 2023-06-29

## 2023-06-29 RX ORDER — SULFAMETHOXAZOLE AND TRIMETHOPRIM 800; 160 MG/1; MG/1
800-160 TABLET ORAL TWICE DAILY
Qty: 20 | Refills: 0 | Status: COMPLETED | COMMUNITY
Start: 2022-04-14 | End: 2023-06-29

## 2023-06-29 RX ORDER — MUPIROCIN 20 MG/G
2 OINTMENT TOPICAL 3 TIMES DAILY
Qty: 1 | Refills: 1 | Status: COMPLETED | COMMUNITY
Start: 2022-04-14 | End: 2023-06-29

## 2023-06-29 RX ORDER — VALACYCLOVIR 1 G/1
1 TABLET, FILM COATED ORAL 3 TIMES DAILY
Qty: 30 | Refills: 1 | Status: COMPLETED | COMMUNITY
Start: 2020-08-15 | End: 2023-06-29

## 2023-06-29 RX ORDER — SULFAMETHOXAZOLE AND TRIMETHOPRIM 800; 160 MG/1; MG/1
800-160 TABLET ORAL
Qty: 20 | Refills: 0 | Status: COMPLETED | COMMUNITY
Start: 2020-12-02 | End: 2023-06-29

## 2023-06-29 RX ORDER — AMOXICILLIN 500 MG/1
500 CAPSULE ORAL
Qty: 18 | Refills: 0 | Status: COMPLETED | COMMUNITY
Start: 2017-08-27 | End: 2023-06-29

## 2023-06-29 RX ORDER — OXYCODONE AND ACETAMINOPHEN 5; 325 MG/1; MG/1
5-325 TABLET ORAL
Qty: 20 | Refills: 0 | Status: COMPLETED | COMMUNITY
Start: 2020-09-14 | End: 2023-06-29

## 2023-06-29 RX ORDER — CYCLOBENZAPRINE HYDROCHLORIDE 5 MG/1
5 TABLET, FILM COATED ORAL 3 TIMES DAILY
Qty: 9 | Refills: 0 | Status: COMPLETED | COMMUNITY
Start: 2020-09-18 | End: 2023-06-29

## 2023-06-29 RX ORDER — VALACYCLOVIR 1 G/1
1 TABLET, FILM COATED ORAL 3 TIMES DAILY
Qty: 30 | Refills: 1 | Status: COMPLETED | COMMUNITY
Start: 2020-12-14 | End: 2023-06-29

## 2023-06-29 RX ORDER — CIPROFLOXACIN AND DEXAMETHASONE 3; 1 MG/ML; MG/ML
0.3-0.1 SUSPENSION/ DROPS AURICULAR (OTIC) TWICE DAILY
Qty: 1 | Refills: 1 | Status: COMPLETED | COMMUNITY
Start: 2017-11-09 | End: 2023-06-29

## 2023-06-29 RX ORDER — CIPROFLOXACIN AND DEXAMETHASONE 3; 1 MG/ML; MG/ML
0.3-0.1 SUSPENSION/ DROPS AURICULAR (OTIC) TWICE DAILY
Qty: 2 | Refills: 3 | Status: COMPLETED | COMMUNITY
Start: 2020-07-28 | End: 2023-06-29

## 2023-06-29 RX ORDER — OMEPRAZOLE 40 MG/1
40 CAPSULE, DELAYED RELEASE ORAL
Qty: 90 | Refills: 3 | Status: COMPLETED | COMMUNITY
Start: 2020-08-06 | End: 2023-06-29

## 2023-06-29 RX ORDER — ACETIC ACID 20 MG/ML
2 SOLUTION AURICULAR (OTIC) DAILY
Qty: 1 | Refills: 0 | Status: COMPLETED | COMMUNITY
Start: 2020-11-13 | End: 2023-06-29

## 2023-06-29 RX ORDER — HYDROCODONE BITARTRATE AND ACETAMINOPHEN 5; 325 MG/1; MG/1
5-325 TABLET ORAL
Qty: 15 | Refills: 0 | Status: COMPLETED | COMMUNITY
Start: 2021-01-31 | End: 2023-06-29

## 2023-06-29 RX ORDER — METOPROLOL TARTRATE 25 MG/1
25 TABLET, FILM COATED ORAL
Qty: 60 | Refills: 0 | Status: COMPLETED | COMMUNITY
Start: 2017-11-03 | End: 2023-06-29

## 2023-06-29 RX ORDER — AMOXICILLIN AND CLAVULANATE POTASSIUM 875; 125 MG/1; MG/1
875-125 TABLET, COATED ORAL
Qty: 20 | Refills: 0 | Status: COMPLETED | COMMUNITY
Start: 2020-07-28 | End: 2023-06-29

## 2023-06-29 RX ORDER — VORICONAZOLE 200 MG/1
200 TABLET ORAL
Qty: 28 | Refills: 2 | Status: COMPLETED | COMMUNITY
Start: 2020-12-02 | End: 2023-06-29

## 2023-06-29 RX ORDER — CLOTRIMAZOLE 10 MG/ML
1 SOLUTION TOPICAL
Qty: 1 | Refills: 1 | Status: COMPLETED | COMMUNITY
Start: 2020-12-02 | End: 2023-06-29

## 2023-06-29 RX ORDER — SULFAMETHOXAZOLE AND TRIMETHOPRIM 800; 160 MG/1; MG/1
800-160 TABLET ORAL
Qty: 20 | Refills: 0 | Status: COMPLETED | COMMUNITY
Start: 2017-11-09 | End: 2023-06-29

## 2023-06-29 RX ORDER — AMOXICILLIN AND CLAVULANATE POTASSIUM 875; 125 MG/1; MG/1
875-125 TABLET, COATED ORAL
Qty: 14 | Refills: 0 | Status: COMPLETED | COMMUNITY
Start: 2020-08-24 | End: 2023-06-29

## 2023-06-29 RX ORDER — OXYCODONE AND ACETAMINOPHEN 5; 325 MG/1; MG/1
5-325 TABLET ORAL
Qty: 24 | Refills: 0 | Status: COMPLETED | COMMUNITY
Start: 2020-08-24 | End: 2023-06-29

## 2023-06-29 RX ORDER — HYDROCODONE BITARTRATE AND ACETAMINOPHEN 5; 325 MG/1; MG/1
5-325 TABLET ORAL
Qty: 8 | Refills: 0 | Status: COMPLETED | COMMUNITY
Start: 2021-02-12 | End: 2023-06-29

## 2023-06-29 RX ORDER — LIDOCAINE HYDROCHLORIDE 20 MG/ML
2 SOLUTION ORAL; TOPICAL
Qty: 200 | Refills: 0 | Status: COMPLETED | COMMUNITY
Start: 2020-08-24 | End: 2023-06-29

## 2023-06-29 RX ORDER — CLOTRIMAZOLE 10 MG/ML
1 SOLUTION TOPICAL
Qty: 1 | Refills: 1 | Status: COMPLETED | COMMUNITY
Start: 2020-12-09 | End: 2023-06-29

## 2023-06-29 NOTE — PHYSICAL EXAM
[No Acute Distress] : no acute distress [Normal Sclera/Conjunctiva] : normal sclera/conjunctiva [EOMI] : extraocular movements intact [Normal Outer Ear/Nose] : the outer ears and nose were normal in appearance [No JVD] : no jugular venous distention [No Edema] : there was no peripheral edema [Normal] : affect was normal and insight and judgment were intact

## 2023-06-29 NOTE — HISTORY OF PRESENT ILLNESS
[FreeTextEntry1] : annual/est care [de-identified] : h/o HTN/HLD\par - on bisoprolol and crestor\par - stopped crestor 1 month ago, b/c did not have results and wasn't sure if should take it \par \par b/l lower abdominal discomfort\par - non radiating\par - concern for cystitis\par - h/o endometriosis; has gyn eval scheduled \par \par HCM\par - c scope done last year had 3 polyps removed and told to come back in 10 yrs\par - declines most all vaccines, took covid because was forced to.\par - mammo due this year\par - had normal DEXA few years ago

## 2023-06-30 NOTE — PROCEDURE
[Cerumen Impaction] : Cerumen Impaction [Same] : same as the Pre Op Dx. [] : Removal of Cerumen [None] : none [Flexible Endoscope] : examined with the flexible endoscope [Normal] : the false vocal folds were pink and regular, the ventricular sulcus was open, the true vocal folds were glistening white, tense and of equal length, mobility, and height [Hoarseness] : hoarseness not clearly evaluated by indirect laryngoscopy [Dysphagia] : dysphagia not clearly evaluated by indirect laryngoscopy [Serial Number: ___] : Serial Number: [unfilled] [Interarytenoid Edema] : interarytenoid area edematous [de-identified] : done for hoarseness\par found to have lingual tonsillitis\par also graciela c/w rl  [de-identified] : lingual tonsillitis [FreeTextEntry5] : b copious cerumen removed atraumatically with suction r>l under microscope, b Manuelito nl

## 2023-06-30 NOTE — HISTORY OF PRESENT ILLNESS
[de-identified] : 14 month followup visit for this 57 y/o F s/p r tympanoplasty in 2/2021. She also has h/o tonsillectomy in 2020. She is presenting with r intermittent otalgia and is here to have both ears checked. She denies any issues with her hearing. 2 months ago she had an episode of vertigo where it felt like the room was spinning. She reports that it lasted for a few weeks and occurred after a uri. She saw Dr. Cunningham and was prescribed a medication and it resolved. She reports she has had no further episodes. She is also c/o intermittent anterior neck pain and hoarseness. She denies GI upset. She wears a mouth guard because she has TMJ and clenches her teeth.

## 2023-06-30 NOTE — REASON FOR VISIT
[Subsequent Evaluation] : a subsequent evaluation for [FreeTextEntry2] : r otalgia, neck pain, hoarseness

## 2023-06-30 NOTE — PHYSICAL EXAM
[Midline] : trachea located in midline position [Laryngoscopy Performed] : laryngoscopy was performed, see procedure section for findings [de-identified] : b copious cerumen removed atraumatically with suction r>l under microscope, b Manuelito nl  [Normal] : no nystagmus [de-identified] : gait steady

## 2023-06-30 NOTE — ASSESSMENT
[FreeTextEntry1] : 1. b cerumen impaction \par -cerumen removed\par -ears felt better\par -avoid qtip usage\par 2. hoarseness and neck pain likely d/t reflux laryngitis and lingual tonsillitis \par -diet/ mechanical precautions -reviewed with pt and given printed list \par -prilosec \par -augmentin\par RTC in 2 weeks; asked to call sooner for any issues

## 2023-07-02 ENCOUNTER — NON-APPOINTMENT (OUTPATIENT)
Age: 58
End: 2023-07-02

## 2023-07-07 ENCOUNTER — APPOINTMENT (OUTPATIENT)
Dept: OTOLARYNGOLOGY | Facility: CLINIC | Age: 58
End: 2023-07-07

## 2023-07-14 ENCOUNTER — APPOINTMENT (OUTPATIENT)
Dept: OTOLARYNGOLOGY | Facility: CLINIC | Age: 58
End: 2023-07-14
Payer: COMMERCIAL

## 2023-07-14 VITALS
TEMPERATURE: 98 F | HEART RATE: 59 BPM | WEIGHT: 180 LBS | HEIGHT: 66 IN | DIASTOLIC BLOOD PRESSURE: 83 MMHG | OXYGEN SATURATION: 97 % | BODY MASS INDEX: 28.93 KG/M2 | SYSTOLIC BLOOD PRESSURE: 163 MMHG

## 2023-07-14 PROCEDURE — 99213 OFFICE O/P EST LOW 20 MIN: CPT

## 2023-07-14 NOTE — HISTORY OF PRESENT ILLNESS
[de-identified] : 2 week follow up appt for this 57 yo f with dysphagia and l otalgia. She had increased l ear pain over July 4 weekend and went to urgent care and has been using drops and it feels better. She has l upper neck discomfort when she swallows. -f.sc

## 2023-07-14 NOTE — ASSESSMENT
[FreeTextEntry1] : discomfort likely due to tmj \par soft diet nsaids, advised to see her dentist - she has mouth guard but I explained there is more which can be done.\par persistent dysphagia - mri ordered (she has contrast dye ct rxn) to r/o mass deeper in neck\par rtc with mri

## 2023-07-14 NOTE — PHYSICAL EXAM
[de-identified] : l>r [de-identified] : old drop residue suctioned out atraumatically [Midline] : trachea located in midline position [Normal] : no rashes

## 2023-07-17 LAB
ALBUMIN SERPL ELPH-MCNC: 4.6 G/DL
ALP BLD-CCNC: 76 U/L
ALT SERPL-CCNC: 40 U/L
ANION GAP SERPL CALC-SCNC: 14 MMOL/L
APPEARANCE: ABNORMAL
AST SERPL-CCNC: 38 U/L
BACTERIA: NEGATIVE /HPF
BILIRUB SERPL-MCNC: 0.3 MG/DL
BILIRUBIN URINE: NEGATIVE
BLOOD URINE: NEGATIVE
BUN SERPL-MCNC: 13 MG/DL
CALCIUM SERPL-MCNC: 9.2 MG/DL
CAST: 4 /LPF
CHLORIDE SERPL-SCNC: 106 MMOL/L
CHOLEST SERPL-MCNC: 219 MG/DL
CO2 SERPL-SCNC: 23 MMOL/L
COLOR: NORMAL
CREAT SERPL-MCNC: 0.69 MG/DL
EGFR: 101 ML/MIN/1.73M2
EPITHELIAL CELLS: 32 /HPF
GLUCOSE QUALITATIVE U: NEGATIVE MG/DL
GLUCOSE SERPL-MCNC: 109 MG/DL
HCV AB SER QL: NONREACTIVE
HCV S/CO RATIO: 0.12 S/CO
HDLC SERPL-MCNC: 38 MG/DL
KETONES URINE: ABNORMAL MG/DL
LDLC SERPL CALC-MCNC: 141 MG/DL
LEUKOCYTE ESTERASE URINE: NEGATIVE
MICROSCOPIC-UA: NORMAL
NITRITE URINE: NEGATIVE
NONHDLC SERPL-MCNC: 181 MG/DL
PH URINE: 5.5
POTASSIUM SERPL-SCNC: 4.2 MMOL/L
PROT SERPL-MCNC: 7.9 G/DL
PROTEIN URINE: 30 MG/DL
RED BLOOD CELLS URINE: 4 /HPF
REVIEW: NORMAL
SODIUM SERPL-SCNC: 143 MMOL/L
SPECIFIC GRAVITY URINE: 1.02
TRIGL SERPL-MCNC: 199 MG/DL
TSH SERPL-ACNC: 1.29 UIU/ML
UROBILINOGEN URINE: 0.2 MG/DL
WHITE BLOOD CELLS URINE: 0 /HPF

## 2023-07-26 ENCOUNTER — APPOINTMENT (OUTPATIENT)
Dept: OTOLARYNGOLOGY | Facility: CLINIC | Age: 58
End: 2023-07-26
Payer: COMMERCIAL

## 2023-07-26 VITALS
DIASTOLIC BLOOD PRESSURE: 80 MMHG | WEIGHT: 180 LBS | HEIGHT: 66 IN | BODY MASS INDEX: 28.93 KG/M2 | TEMPERATURE: 98 F | OXYGEN SATURATION: 98 % | HEART RATE: 62 BPM | SYSTOLIC BLOOD PRESSURE: 156 MMHG

## 2023-07-26 DIAGNOSIS — R13.14 DYSPHAGIA, PHARYNGOESOPHAGEAL PHASE: ICD-10-CM

## 2023-07-26 DIAGNOSIS — H92.02 OTALGIA, LEFT EAR: ICD-10-CM

## 2023-07-26 PROCEDURE — 99213 OFFICE O/P EST LOW 20 MIN: CPT

## 2023-07-26 NOTE — HISTORY OF PRESENT ILLNESS
[de-identified] : 12 day followup visit for this 59 y/o F with dysphagia and l otalgia. She had increased l ear pain over 4th of July weekend and went to urgent care and has been using drops and it feels better. For l upper neck discomfort and dysphagia she is here to review MRI. At last visit she was found to have TMJ. She has been on a soft diet and taking NSAIDs and feels it is improved. She has a mouth guard.

## 2023-07-26 NOTE — PHYSICAL EXAM
[Midline] : trachea located in midline position [de-identified] : b mild- improved  [Normal] : no neck adenopathy [de-identified] : gait steady

## 2023-07-26 NOTE — ASSESSMENT
[FreeTextEntry1] : l otalgia, dysphagia\par -MRI neck revealed interval resolution of palatine tonsils compared with CT neck from 8/13/22, stable mild hypertrophy of lingual tonsils, <1 cm lymph nodes- stable likely reactive, thyroid nodules best assess on thyroid US from 2022, minimal l mastoid effusion -images and report reviewed with pt \par -TMJ improved\par -pt feels better, reassured\par RTC as needed

## 2023-07-26 NOTE — DATA REVIEWED
[de-identified] : MRI neck revealed interval resolution of enlarged palatine tonsils compared with CT neck from 8/13/22 (they have been removed), stable mild hypertrophy of lingual tonsils, <1 cm lymph nodes- stable likely reactive, thyroid nodules best assess on thyroid US from 2022, minimal l mastoid effusion -images and report reviewed with pt  [de-identified] : mirror exam appeared nl

## 2023-07-31 ENCOUNTER — APPOINTMENT (OUTPATIENT)
Dept: HEART AND VASCULAR | Facility: CLINIC | Age: 58
End: 2023-07-31
Payer: COMMERCIAL

## 2023-07-31 VITALS
HEART RATE: 67 BPM | WEIGHT: 182 LBS | BODY MASS INDEX: 29.25 KG/M2 | SYSTOLIC BLOOD PRESSURE: 158 MMHG | HEIGHT: 66 IN | DIASTOLIC BLOOD PRESSURE: 80 MMHG

## 2023-07-31 DIAGNOSIS — R94.31 ABNORMAL ELECTROCARDIOGRAM [ECG] [EKG]: ICD-10-CM

## 2023-07-31 DIAGNOSIS — Z82.49 FAMILY HISTORY OF ISCHEMIC HEART DISEASE AND OTHER DISEASES OF THE CIRCULATORY SYSTEM: ICD-10-CM

## 2023-07-31 DIAGNOSIS — Z82.3 FAMILY HISTORY OF STROKE: ICD-10-CM

## 2023-07-31 PROCEDURE — 93000 ELECTROCARDIOGRAM COMPLETE: CPT

## 2023-07-31 PROCEDURE — 99204 OFFICE O/P NEW MOD 45 MIN: CPT | Mod: 25

## 2023-07-31 RX ORDER — OMEPRAZOLE 40 MG/1
40 CAPSULE, DELAYED RELEASE ORAL
Qty: 30 | Refills: 0 | Status: COMPLETED | COMMUNITY
Start: 2023-06-29 | End: 2023-07-31

## 2023-07-31 RX ORDER — AMOXICILLIN AND CLAVULANATE POTASSIUM 875; 125 MG/1; MG/1
875-125 TABLET, COATED ORAL
Qty: 20 | Refills: 0 | Status: COMPLETED | COMMUNITY
Start: 2023-06-29 | End: 2023-07-31

## 2023-07-31 RX ORDER — BISOPROLOL FUMARATE 5 MG/1
5 TABLET, FILM COATED ORAL DAILY
Qty: 90 | Refills: 3 | Status: DISCONTINUED | COMMUNITY
Start: 2023-06-29 | End: 2023-07-31

## 2023-07-31 NOTE — HISTORY OF PRESENT ILLNESS
[FreeTextEntry1] : 58 year old female with PMHX of HTN and HLD with FMHX of CAD  ( father CABG mid 70's) and CVA (father mid 60's) sent in from PCP for evaluation of palpitations.   Patient reported palpitation like symptoms approximately 4 years ago. She was reported midsternal chest poundings. She was evaluated at a local hospital and was placed with an event monitor. She was told no significant findings. She has not had a return of palpitations since.   Patient is somewhat active. She does not walk often but when she does she can walk up to 2 miles without complications. She walks up subway stairs with no chest pains or dyspnea. She has no edema or orthopnea.   She takes bisoprolol for blood pressure. She was recently started on Rosuvastatin because of elevated lipids. She has yet to start.   Previous [facility] (7/17/2023) Total Chol 219, HDL 38, , TSH 1.29

## 2023-07-31 NOTE — DISCUSSION/SUMMARY
[FreeTextEntry1] : 58 year old female with PMHX of HTN and HLD with FMHX of CAD  ( father CABG mid 70's) and CVA (father mid 60's) sent in from PCP for evaluation of palpitations.   CVD Risk: Active without any chest pains or dyspnea however EKG abnormal. EKG SR 66 with LVH and TWA. Will get echocardiogram to assess structure and stress ekg to rule out CAD HTN: Goal < 140/90. Elevated today. Will switch Bisoprolol to Diltiazem 180mg. She will start a BP log and bring in her cuff to compare to our in office machines.  HLD: LDL goal < 100. ASCVD score at 7.4. Urged her to continue the Rosuvastatin 10mg  WIll return for stress ekg and echocardiogram I, Dr. Destiney Fernandez personally performed the evaluation and management services for this new patient who presents today with a new problem.  The evaluation and management includes conducting the examination assessing all conditions and establishing a new plan of care.  Today my ACP Surekha Hubbard was here and recorded the evaluation and management services.    [EKG obtained to assist in diagnosis and management of assessed problem(s)] : EKG obtained to assist in diagnosis and management of assessed problem(s)

## 2023-07-31 NOTE — REVIEW OF SYSTEMS
[Fever] : no fever [Chills] : no chills [Dyspnea on exertion] : not dyspnea during exertion [Chest Discomfort] : no chest discomfort [Lower Ext Edema] : no extremity edema [Palpitations] : no palpitations [Orthopnea] : no orthopnea [Nausea] : no nausea [Vomiting] : no vomiting [Diarrhea] : diarrhea [Dizziness] : no dizziness [Numbness (Hypoesthesia)] : no numbness [Weakness] : no weakness [Speech Disturbance] : no speech disturbance [Easy Bleeding] : no tendency for easy bleeding

## 2023-09-28 ENCOUNTER — APPOINTMENT (OUTPATIENT)
Dept: HEART AND VASCULAR | Facility: CLINIC | Age: 58
End: 2023-09-28
Payer: COMMERCIAL

## 2023-09-28 ENCOUNTER — TRANSCRIPTION ENCOUNTER (OUTPATIENT)
Age: 58
End: 2023-09-28

## 2023-09-28 VITALS
HEART RATE: 70 BPM | WEIGHT: 184 LBS | SYSTOLIC BLOOD PRESSURE: 140 MMHG | HEIGHT: 66 IN | BODY MASS INDEX: 29.57 KG/M2 | DIASTOLIC BLOOD PRESSURE: 70 MMHG | RESPIRATION RATE: 17 BRPM

## 2023-09-28 DIAGNOSIS — R07.89 OTHER CHEST PAIN: ICD-10-CM

## 2023-09-28 PROCEDURE — 93306 TTE W/DOPPLER COMPLETE: CPT

## 2023-09-28 PROCEDURE — 99214 OFFICE O/P EST MOD 30 MIN: CPT | Mod: 25

## 2023-09-28 PROCEDURE — 93015 CV STRESS TEST SUPVJ I&R: CPT

## 2023-09-28 RX ORDER — ROSUVASTATIN CALCIUM 10 MG/1
10 TABLET, FILM COATED ORAL
Qty: 90 | Refills: 3 | Status: ACTIVE | COMMUNITY
Start: 2023-06-29 | End: 1900-01-01

## 2023-09-29 PROBLEM — R07.89 CHEST DISCOMFORT: Status: ACTIVE | Noted: 2023-09-29

## 2023-09-29 LAB
CHOLEST SERPL-MCNC: 224 MG/DL
HDLC SERPL-MCNC: 49 MG/DL
LDLC SERPL CALC-MCNC: 107 MG/DL
NONHDLC SERPL-MCNC: 175 MG/DL
TRIGL SERPL-MCNC: 398 MG/DL

## 2024-03-24 ENCOUNTER — RX RENEWAL (OUTPATIENT)
Age: 59
End: 2024-03-24

## 2024-03-28 ENCOUNTER — APPOINTMENT (OUTPATIENT)
Dept: HEART AND VASCULAR | Facility: CLINIC | Age: 59
End: 2024-03-28
Payer: COMMERCIAL

## 2024-03-28 VITALS
SYSTOLIC BLOOD PRESSURE: 147 MMHG | DIASTOLIC BLOOD PRESSURE: 84 MMHG | WEIGHT: 184 LBS | HEART RATE: 74 BPM | HEIGHT: 66 IN | BODY MASS INDEX: 29.57 KG/M2 | OXYGEN SATURATION: 97 %

## 2024-03-28 DIAGNOSIS — I10 ESSENTIAL (PRIMARY) HYPERTENSION: ICD-10-CM

## 2024-03-28 DIAGNOSIS — E78.5 HYPERLIPIDEMIA, UNSPECIFIED: ICD-10-CM

## 2024-03-28 PROCEDURE — 99214 OFFICE O/P EST MOD 30 MIN: CPT | Mod: 25

## 2024-03-28 PROCEDURE — 93000 ELECTROCARDIOGRAM COMPLETE: CPT

## 2024-03-28 RX ORDER — DILTIAZEM HYDROCHLORIDE 240 MG/1
240 CAPSULE, EXTENDED RELEASE ORAL DAILY
Qty: 90 | Refills: 3 | Status: ACTIVE | COMMUNITY
Start: 2024-03-24 | End: 1900-01-01

## 2024-03-28 RX ORDER — DILTIAZEM HYDROCHLORIDE 180 MG/1
180 CAPSULE, EXTENDED RELEASE ORAL DAILY
Qty: 90 | Refills: 1 | Status: DISCONTINUED | COMMUNITY
Start: 2023-07-31 | End: 2024-03-28

## 2024-03-29 PROBLEM — I10 HTN (HYPERTENSION), BENIGN: Status: ACTIVE | Noted: 2023-06-29

## 2024-03-29 PROBLEM — E78.5 HLD (HYPERLIPIDEMIA): Status: ACTIVE | Noted: 2023-06-29

## 2024-03-29 NOTE — HISTORY OF PRESENT ILLNESS
[FreeTextEntry1] : Patient walks 15 to 20 minutes and climbs subway stairs without difficulty.  She infrequently has red meat and her food is all home-cooked.  Her blood pressure at home is 136-140 systolic.  She denies chest discomfort.

## 2024-03-29 NOTE — DISCUSSION/SUMMARY
[FreeTextEntry1] : Patient is physically active without difficulty.  The EKG shows left axis deviation and left ventricular hypertrophy.  Her diet is excellent.  Her blood pressure is above goal.  She will increase her diltiazem and take 240 mg daily.  She will continue rosuvastatin for her hypercholesterolemia. [EKG obtained to assist in diagnosis and management of assessed problem(s)] : EKG obtained to assist in diagnosis and management of assessed problem(s)

## 2024-05-22 ENCOUNTER — APPOINTMENT (OUTPATIENT)
Dept: OPHTHALMOLOGY | Facility: CLINIC | Age: 59
End: 2024-05-22
Payer: COMMERCIAL

## 2024-05-22 ENCOUNTER — NON-APPOINTMENT (OUTPATIENT)
Age: 59
End: 2024-05-22

## 2024-05-22 PROCEDURE — 92133 CPTRZD OPH DX IMG PST SGM ON: CPT

## 2024-05-22 PROCEDURE — 92014 COMPRE OPH EXAM EST PT 1/>: CPT

## 2024-07-16 ENCOUNTER — APPOINTMENT (OUTPATIENT)
Dept: INTERNAL MEDICINE | Facility: CLINIC | Age: 59
End: 2024-07-16
Payer: COMMERCIAL

## 2024-07-16 VITALS
RESPIRATION RATE: 18 BRPM | HEIGHT: 66 IN | HEART RATE: 77 BPM | WEIGHT: 180 LBS | OXYGEN SATURATION: 97 % | TEMPERATURE: 98.2 F | DIASTOLIC BLOOD PRESSURE: 76 MMHG | BODY MASS INDEX: 28.93 KG/M2 | SYSTOLIC BLOOD PRESSURE: 165 MMHG

## 2024-07-16 DIAGNOSIS — M54.16 RADICULOPATHY, LUMBAR REGION: ICD-10-CM

## 2024-07-16 DIAGNOSIS — R20.2 PARESTHESIA OF SKIN: ICD-10-CM

## 2024-07-16 DIAGNOSIS — I10 ESSENTIAL (PRIMARY) HYPERTENSION: ICD-10-CM

## 2024-07-16 DIAGNOSIS — E78.5 HYPERLIPIDEMIA, UNSPECIFIED: ICD-10-CM

## 2024-07-16 DIAGNOSIS — R25.2 CRAMP AND SPASM: ICD-10-CM

## 2024-07-16 PROCEDURE — 99214 OFFICE O/P EST MOD 30 MIN: CPT

## 2024-07-16 PROCEDURE — 36415 COLL VENOUS BLD VENIPUNCTURE: CPT

## 2024-07-17 ENCOUNTER — LABORATORY RESULT (OUTPATIENT)
Age: 59
End: 2024-07-17

## 2024-07-17 ENCOUNTER — NON-APPOINTMENT (OUTPATIENT)
Age: 59
End: 2024-07-17

## 2024-07-17 DIAGNOSIS — R73.9 HYPERGLYCEMIA, UNSPECIFIED: ICD-10-CM

## 2024-07-17 LAB
ALBUMIN SERPL ELPH-MCNC: 4.9 G/DL
ALP BLD-CCNC: 163 U/L
ALT SERPL-CCNC: 26 U/L
ANION GAP SERPL CALC-SCNC: 17 MMOL/L
AST SERPL-CCNC: 27 U/L
BILIRUB SERPL-MCNC: 0.4 MG/DL
BUN SERPL-MCNC: 15 MG/DL
CALCIUM SERPL-MCNC: 10.3 MG/DL
CHLORIDE SERPL-SCNC: 89 MMOL/L
CHOLEST SERPL-MCNC: 242 MG/DL
CK SERPL-CCNC: 132 U/L
CO2 SERPL-SCNC: 24 MMOL/L
CREAT SERPL-MCNC: 0.63 MG/DL
EGFR: 103 ML/MIN/1.73M2
GLUCOSE SERPL-MCNC: 605 MG/DL
HDLC SERPL-MCNC: 45 MG/DL
LDLC SERPL CALC-MCNC: 98 MG/DL
MAGNESIUM SERPL-MCNC: 2.2 MG/DL
NONHDLC SERPL-MCNC: 197 MG/DL
POTASSIUM SERPL-SCNC: 4.3 MMOL/L
PROT SERPL-MCNC: 8.5 G/DL
SODIUM SERPL-SCNC: 130 MMOL/L
TRIGL SERPL-MCNC: 587 MG/DL
VIT B12 SERPL-MCNC: 1202 PG/ML

## 2024-07-18 DIAGNOSIS — E11.65 TYPE 2 DIABETES MELLITUS WITH HYPERGLYCEMIA: ICD-10-CM

## 2024-07-18 RX ORDER — METFORMIN HYDROCHLORIDE 500 MG/1
500 TABLET, COATED ORAL
Qty: 60 | Refills: 2 | Status: ACTIVE | COMMUNITY
Start: 2024-07-18 | End: 1900-01-01

## 2024-07-23 RX ORDER — BLOOD-GLUCOSE SENSOR
EACH MISCELLANEOUS
Qty: 6 | Refills: 3 | Status: ACTIVE | COMMUNITY
Start: 2024-07-23 | End: 1900-01-01

## 2024-07-24 ENCOUNTER — APPOINTMENT (OUTPATIENT)
Dept: MRI IMAGING | Facility: CLINIC | Age: 59
End: 2024-07-24

## 2024-07-25 ENCOUNTER — APPOINTMENT (OUTPATIENT)
Dept: HEART AND VASCULAR | Facility: CLINIC | Age: 59
End: 2024-07-25

## 2024-08-10 ENCOUNTER — APPOINTMENT (OUTPATIENT)
Dept: MRI IMAGING | Facility: CLINIC | Age: 59
End: 2024-08-10

## 2024-08-10 PROCEDURE — 72148 MRI LUMBAR SPINE W/O DYE: CPT

## 2024-08-13 ENCOUNTER — TRANSCRIPTION ENCOUNTER (OUTPATIENT)
Age: 59
End: 2024-08-13

## 2024-08-13 DIAGNOSIS — M54.16 RADICULOPATHY, LUMBAR REGION: ICD-10-CM

## 2024-08-14 ENCOUNTER — APPOINTMENT (OUTPATIENT)
Dept: ORTHOPEDIC SURGERY | Facility: CLINIC | Age: 59
End: 2024-08-14
Payer: COMMERCIAL

## 2024-08-14 DIAGNOSIS — M54.16 RADICULOPATHY, LUMBAR REGION: ICD-10-CM

## 2024-08-14 DIAGNOSIS — M54.2 CERVICALGIA: ICD-10-CM

## 2024-08-14 PROCEDURE — 99204 OFFICE O/P NEW MOD 45 MIN: CPT

## 2024-08-14 PROCEDURE — 72110 X-RAY EXAM L-2 SPINE 4/>VWS: CPT

## 2024-08-14 RX ORDER — GABAPENTIN 300 MG/1
300 CAPSULE ORAL 3 TIMES DAILY
Qty: 90 | Refills: 1 | Status: ACTIVE | COMMUNITY
Start: 2024-08-14 | End: 1900-01-01

## 2024-08-14 RX ORDER — CYCLOBENZAPRINE HYDROCHLORIDE 10 MG/1
10 TABLET, FILM COATED ORAL 3 TIMES DAILY
Qty: 42 | Refills: 1 | Status: ACTIVE | COMMUNITY
Start: 2024-08-14 | End: 1900-01-01

## 2024-08-17 NOTE — HISTORY OF PRESENT ILLNESS
[de-identified] : 60 y/o female presenting with worsening chronic lower back pain and neck pain. She has pain that radiates down both legs. She also describes sharp intermittent pain in her hands. She has tingling in her hands and feet. She has numbness in left foot secondary to an injury she had 2 years ago. She recently started a B12 vitamin which is helping some. She has poorly controlled diabetes. She is referred by her PCP and has recent lumbar MRI.   denies recent illness, fevers, weakness, balance problems, saddle anesthesia, urinary retention or fecal incontinence.

## 2024-08-17 NOTE — ASSESSMENT
[FreeTextEntry1] : 60 y/o female presenting with worsening chronic lower back pain and neck pain. She has pain that radiates down both legs. She also describes sharp intermittent pain in her hands. She has tingling in her hands and feet. She has numbness in left foot secondary to an injury she had 2 years ago. She recently started a B12 vitamin which is helping some. She has poorly controlled diabetes. She is referred by her PCP and has recent lumbar MRI. She denies recent illness, fevers, weakness, balance problems, saddle anesthesia, urinary retention or fecal incontinence. I independently reviewed her lumbar MRI which shows degenerative changes without compression of the neural elements.  Patient will be sent for a MRI of cervical spine. The patient was given a referral for physical therapy. Start cyclobenzaprine. Start gabapentin. Continue B12 vitamin. Patient given referral for an endocrinologist for diabetes management. Her Hgb A1C needs to be under 8 for consideration for injections or surgery.  F/U after MRI. We discussed red flag symptoms that would require emergent evaluation. She knows to call with any questions or concerns or if her symptoms acutely worsen.

## 2024-08-17 NOTE — PHYSICAL EXAM
[de-identified] :  General: No acute distress, conversant, well-nourished. Head: Normocephalic, atraumatic Neck: trachea midline, FROM Heart: normotensive and normal rate and rhythm Lungs: No labored breathing Skin: No abrasions, no rashes, no edema Psych: Alert and oriented to person, place and time Extremities: no peripheral edema or digital cyanosis Gait: Normal gait. Can perform tandem gait.  Vascular: warm and well perfused distally, palpable distal pulses   MSK: Spine: No tenderness to palpation.  No step-off, no deformity.   NEURO: Sensation          Left           C5     2/2               C6     2/2               C7     2/2               C8     2/2              T1     2/2                        Right         C5     2/2               C6     2/2               C7     2/2               C8     2/2              T1     2/2        Motor:                                                Left             C5 (deltoid abduction)             5/5               C6 (biceps flexion)                   5/5                C7 (triceps extension)             5/5               C8 (finger flexion)                     5/5               T1 (interosseous)                     5/5                                                            Right           C5 (deltoid abduction)             5/5               C6 (biceps flexion)                   5/5                C7 (triceps extension)             5/5               C8 (finger flexion)                     5/5               T1 (interosseous)                     5/5                       Sensation Left L2  -  2/2            Left L3  -  2/2 Left L4  -  1/2 Left L5  -  1/2 Left S1  -  1/2   Right L2  -  2/2            Right L3  -  2/2 Right L4  -  2/2 Right L5  -  2/2 Right S1  -  2/2   Motor: Left L2 (hip flexion)                            5/5                Left L3 (knee extension)                   5/5                Left L4 (ankle dorsiflexion)                 5/5                Left L5 (long toe extensor)                5/5                Left S1 (ankle plantar flexion)           5/5   Right L2 (hip flexion)                            5/5                Right L3 (knee extension)                   5/5                Right L4 (ankle dorsiflexion)                 5/5                Right L5 (long toe extensor)                5/5                Right S1 (ankle plantar flexion)           5/5   Reflexes: Normal and symmetric Negative Spurlings test.  Negative Hoffmans reflex.  Negative clonus.  Down-going Babinski. [de-identified] : I independently reviewed her lumbar MRI which shows degenerative changes without compression of the neural elements.     lumbar MRI from [facility] 8/12/24:  IMPRESSION: 1. Mild multilevel degenerative disc disease is present. 2. L4-L5 demonstrates mild spinal canal stenosis with moderate narrowing of the left lateral recess with broad-based central to left paracentral disc protrusion abutting the descending left L5 nerve roots.  I ordered radiographs to evaluate the patient's symptoms. Lumbar 4 view radiographs taken in the office today show no dislocation or fracture.  Lumbar spondylosis.  No instability on dynamic series.

## 2024-08-17 NOTE — ASSESSMENT
[FreeTextEntry1] : 60 y/o female presenting with worsening chronic lower back pain and neck pain. She has pain that radiates down both legs. She also describes sharp intermittent pain in her hands. She has tingling in her hands and feet. She has numbness in left foot secondary to an injury she had 2 years ago. She recently started a B12 vitamin which is helping some. She has poorly controlled diabetes. She is referred by her PCP and has recent lumbar MRI. She denies recent illness, fevers, weakness, balance problems, saddle anesthesia, urinary retention or fecal incontinence. I independently reviewed her lumbar MRI which shows degenerative changes without compression of the neural elements.  Patient will be sent for a MRI of cervical spine. The patient was given a referral for physical therapy. Start cyclobenzaprine. Start gabapentin. Continue B12 vitamin. Patient given referral for an endocrinologist for diabetes management. Her Hgb A1C needs to be under 8 for consideration for injections or surgery.  F/U after MRI. We discussed red flag symptoms that would require emergent evaluation. She knows to call with any questions or concerns or if her symptoms acutely worsen. GENERAL: NAD  HEAD:  Atraumatic, Normocephalic  CHEST/LUNG: Clear to auscultation bilaterally  HEART: Normal S1/S2  PSYCH: AAOx3  NEUROLOGY: non-focal  SKIN: No obvious rashes or lesions.

## 2024-08-17 NOTE — PHYSICAL EXAM
[de-identified] :  General: No acute distress, conversant, well-nourished. Head: Normocephalic, atraumatic Neck: trachea midline, FROM Heart: normotensive and normal rate and rhythm Lungs: No labored breathing Skin: No abrasions, no rashes, no edema Psych: Alert and oriented to person, place and time Extremities: no peripheral edema or digital cyanosis Gait: Normal gait. Can perform tandem gait.  Vascular: warm and well perfused distally, palpable distal pulses   MSK: Spine: No tenderness to palpation.  No step-off, no deformity.   NEURO: Sensation          Left           C5     2/2               C6     2/2               C7     2/2               C8     2/2              T1     2/2                        Right         C5     2/2               C6     2/2               C7     2/2               C8     2/2              T1     2/2        Motor:                                                Left             C5 (deltoid abduction)             5/5               C6 (biceps flexion)                   5/5                C7 (triceps extension)             5/5               C8 (finger flexion)                     5/5               T1 (interosseous)                     5/5                                                            Right           C5 (deltoid abduction)             5/5               C6 (biceps flexion)                   5/5                C7 (triceps extension)             5/5               C8 (finger flexion)                     5/5               T1 (interosseous)                     5/5                       Sensation Left L2  -  2/2            Left L3  -  2/2 Left L4  -  1/2 Left L5  -  1/2 Left S1  -  1/2   Right L2  -  2/2            Right L3  -  2/2 Right L4  -  2/2 Right L5  -  2/2 Right S1  -  2/2   Motor: Left L2 (hip flexion)                            5/5                Left L3 (knee extension)                   5/5                Left L4 (ankle dorsiflexion)                 5/5                Left L5 (long toe extensor)                5/5                Left S1 (ankle plantar flexion)           5/5   Right L2 (hip flexion)                            5/5                Right L3 (knee extension)                   5/5                Right L4 (ankle dorsiflexion)                 5/5                Right L5 (long toe extensor)                5/5                Right S1 (ankle plantar flexion)           5/5   Reflexes: Normal and symmetric Negative Spurlings test.  Negative Hoffmans reflex.  Negative clonus.  Down-going Babinski. [de-identified] : I independently reviewed her lumbar MRI which shows degenerative changes without compression of the neural elements.     lumbar MRI from [facility] 8/12/24:  IMPRESSION: 1. Mild multilevel degenerative disc disease is present. 2. L4-L5 demonstrates mild spinal canal stenosis with moderate narrowing of the left lateral recess with broad-based central to left paracentral disc protrusion abutting the descending left L5 nerve roots.  I ordered radiographs to evaluate the patient's symptoms. Lumbar 4 view radiographs taken in the office today show no dislocation or fracture.  Lumbar spondylosis.  No instability on dynamic series.

## 2024-08-17 NOTE — HISTORY OF PRESENT ILLNESS
[de-identified] : 58 y/o female presenting with worsening chronic lower back pain and neck pain. She has pain that radiates down both legs. She also describes sharp intermittent pain in her hands. She has tingling in her hands and feet. She has numbness in left foot secondary to an injury she had 2 years ago. She recently started a B12 vitamin which is helping some. She has poorly controlled diabetes. She is referred by her PCP and has recent lumbar MRI.   denies recent illness, fevers, weakness, balance problems, saddle anesthesia, urinary retention or fecal incontinence.

## 2024-09-23 ENCOUNTER — APPOINTMENT (OUTPATIENT)
Dept: MRI IMAGING | Facility: CLINIC | Age: 59
End: 2024-09-23
Payer: COMMERCIAL

## 2024-09-23 PROCEDURE — 72141 MRI NECK SPINE W/O DYE: CPT

## 2024-10-02 ENCOUNTER — APPOINTMENT (OUTPATIENT)
Dept: ORTHOPEDIC SURGERY | Facility: CLINIC | Age: 59
End: 2024-10-02

## 2024-10-02 PROCEDURE — 99214 OFFICE O/P EST MOD 30 MIN: CPT

## 2024-10-29 ENCOUNTER — APPOINTMENT (OUTPATIENT)
Dept: ENDOCRINOLOGY | Facility: CLINIC | Age: 59
End: 2024-10-29
Payer: COMMERCIAL

## 2024-10-29 VITALS
BODY MASS INDEX: 25.18 KG/M2 | SYSTOLIC BLOOD PRESSURE: 163 MMHG | DIASTOLIC BLOOD PRESSURE: 88 MMHG | HEART RATE: 83 BPM | WEIGHT: 156 LBS

## 2024-10-29 DIAGNOSIS — E11.65 TYPE 2 DIABETES MELLITUS WITH HYPERGLYCEMIA: ICD-10-CM

## 2024-10-29 PROCEDURE — G2211 COMPLEX E/M VISIT ADD ON: CPT | Mod: NC

## 2024-10-29 PROCEDURE — 99205 OFFICE O/P NEW HI 60 MIN: CPT

## 2024-11-01 ENCOUNTER — TRANSCRIPTION ENCOUNTER (OUTPATIENT)
Age: 59
End: 2024-11-01

## 2024-11-01 LAB
ALBUMIN SERPL ELPH-MCNC: 4.4 G/DL
ALP BLD-CCNC: 98 U/L
ALT SERPL-CCNC: 19 U/L
ANION GAP SERPL CALC-SCNC: 13 MMOL/L
AST SERPL-CCNC: 18 U/L
BILIRUB SERPL-MCNC: 0.4 MG/DL
BUN SERPL-MCNC: 11 MG/DL
C PEPTIDE SERPL-MCNC: 3.6 NG/ML
CALCIUM SERPL-MCNC: 9.4 MG/DL
CHLORIDE SERPL-SCNC: 104 MMOL/L
CHOLEST SERPL-MCNC: 215 MG/DL
CO2 SERPL-SCNC: 24 MMOL/L
CREAT SERPL-MCNC: 0.67 MG/DL
CREAT SPEC-SCNC: 50 MG/DL
EGFR: 101 ML/MIN/1.73M2
ESTIMATED AVERAGE GLUCOSE: 123 MG/DL
GLUCOSE SERPL-MCNC: 107 MG/DL
HBA1C MFR BLD HPLC: 5.9 %
HCT VFR BLD CALC: 37.3 %
HDLC SERPL-MCNC: 39 MG/DL
HGB BLD-MCNC: 12.5 G/DL
LDLC SERPL CALC-MCNC: 133 MG/DL
MCHC RBC-ENTMCNC: 28.6 PG
MCHC RBC-ENTMCNC: 33.5 G/DL
MCV RBC AUTO: 85.4 FL
MICROALBUMIN 24H UR DL<=1MG/L-MCNC: 3.9 MG/DL
MICROALBUMIN/CREAT 24H UR-RTO: 78 MG/G
NONHDLC SERPL-MCNC: 176 MG/DL
PLATELET # BLD AUTO: 214 K/UL
POTASSIUM SERPL-SCNC: 4.6 MMOL/L
PROT SERPL-MCNC: 7.7 G/DL
RBC # BLD: 4.37 M/UL
RBC # FLD: 13.7 %
SODIUM SERPL-SCNC: 141 MMOL/L
TRIGL SERPL-MCNC: 238 MG/DL
TSH SERPL-ACNC: 1.9 UIU/ML
WBC # FLD AUTO: 6.36 K/UL

## 2024-11-04 LAB — GAD65 AB SER-MCNC: 0 NMOL/L

## 2024-11-08 ENCOUNTER — APPOINTMENT (OUTPATIENT)
Dept: ULTRASOUND IMAGING | Facility: CLINIC | Age: 59
End: 2024-11-08
Payer: COMMERCIAL

## 2024-11-08 PROCEDURE — 76536 US EXAM OF HEAD AND NECK: CPT

## 2024-11-12 DIAGNOSIS — E04.2 NONTOXIC MULTINODULAR GOITER: ICD-10-CM

## 2024-11-12 RX ORDER — DAPAGLIFLOZIN 5 MG/1
5 TABLET, FILM COATED ORAL
Qty: 30 | Refills: 3 | Status: ACTIVE | COMMUNITY
Start: 2024-11-12 | End: 1900-01-01

## 2024-11-20 ENCOUNTER — APPOINTMENT (OUTPATIENT)
Dept: HEART AND VASCULAR | Facility: CLINIC | Age: 59
End: 2024-11-20
Payer: COMMERCIAL

## 2024-11-20 VITALS
OXYGEN SATURATION: 98 % | SYSTOLIC BLOOD PRESSURE: 169 MMHG | WEIGHT: 156 LBS | DIASTOLIC BLOOD PRESSURE: 80 MMHG | TEMPERATURE: 98.7 F | BODY MASS INDEX: 25.07 KG/M2 | HEART RATE: 71 BPM | HEIGHT: 66 IN

## 2024-11-20 VITALS — DIASTOLIC BLOOD PRESSURE: 84 MMHG | SYSTOLIC BLOOD PRESSURE: 160 MMHG

## 2024-11-20 PROCEDURE — 99214 OFFICE O/P EST MOD 30 MIN: CPT | Mod: 25

## 2024-11-20 PROCEDURE — 93000 ELECTROCARDIOGRAM COMPLETE: CPT

## 2024-11-26 ENCOUNTER — APPOINTMENT (OUTPATIENT)
Dept: INTERNAL MEDICINE | Facility: CLINIC | Age: 59
End: 2024-11-26
Payer: COMMERCIAL

## 2024-11-26 VITALS
OXYGEN SATURATION: 99 % | SYSTOLIC BLOOD PRESSURE: 159 MMHG | HEIGHT: 66 IN | DIASTOLIC BLOOD PRESSURE: 76 MMHG | TEMPERATURE: 98.3 F | RESPIRATION RATE: 17 BRPM | BODY MASS INDEX: 25.07 KG/M2 | WEIGHT: 156 LBS | HEART RATE: 77 BPM

## 2024-11-26 VITALS — SYSTOLIC BLOOD PRESSURE: 129 MMHG | DIASTOLIC BLOOD PRESSURE: 70 MMHG

## 2024-11-26 DIAGNOSIS — Z23 ENCOUNTER FOR IMMUNIZATION: ICD-10-CM

## 2024-11-26 DIAGNOSIS — Z00.00 ENCOUNTER FOR GENERAL ADULT MEDICAL EXAMINATION W/OUT ABNORMAL FINDINGS: ICD-10-CM

## 2024-11-26 DIAGNOSIS — R80.9 PROTEINURIA, UNSPECIFIED: ICD-10-CM

## 2024-11-26 DIAGNOSIS — I10 ESSENTIAL (PRIMARY) HYPERTENSION: ICD-10-CM

## 2024-11-26 DIAGNOSIS — S00.522A BLISTER (NONTHERMAL) OF ORAL CAVITY, INITIAL ENCOUNTER: ICD-10-CM

## 2024-11-26 DIAGNOSIS — E78.5 HYPERLIPIDEMIA, UNSPECIFIED: ICD-10-CM

## 2024-11-26 DIAGNOSIS — E04.2 NONTOXIC MULTINODULAR GOITER: ICD-10-CM

## 2024-11-26 DIAGNOSIS — E11.65 TYPE 2 DIABETES MELLITUS WITH HYPERGLYCEMIA: ICD-10-CM

## 2024-11-26 PROCEDURE — 99396 PREV VISIT EST AGE 40-64: CPT

## 2024-12-13 ENCOUNTER — TRANSCRIPTION ENCOUNTER (OUTPATIENT)
Age: 59
End: 2024-12-13

## 2024-12-16 ENCOUNTER — APPOINTMENT (OUTPATIENT)
Dept: ENDOCRINOLOGY | Facility: CLINIC | Age: 59
End: 2024-12-16
Payer: COMMERCIAL

## 2024-12-16 VITALS
WEIGHT: 155 LBS | SYSTOLIC BLOOD PRESSURE: 175 MMHG | DIASTOLIC BLOOD PRESSURE: 86 MMHG | HEART RATE: 84 BPM | BODY MASS INDEX: 25.02 KG/M2

## 2024-12-16 DIAGNOSIS — E04.1 NONTOXIC SINGLE THYROID NODULE: ICD-10-CM

## 2024-12-16 DIAGNOSIS — E04.2 NONTOXIC MULTINODULAR GOITER: ICD-10-CM

## 2024-12-16 PROCEDURE — 99214 OFFICE O/P EST MOD 30 MIN: CPT

## 2025-01-28 ENCOUNTER — APPOINTMENT (OUTPATIENT)
Dept: ENDOCRINOLOGY | Facility: CLINIC | Age: 60
End: 2025-01-28
Payer: COMMERCIAL

## 2025-01-28 VITALS
HEART RATE: 75 BPM | SYSTOLIC BLOOD PRESSURE: 179 MMHG | WEIGHT: 157 LBS | BODY MASS INDEX: 25.34 KG/M2 | DIASTOLIC BLOOD PRESSURE: 87 MMHG

## 2025-01-28 DIAGNOSIS — E04.2 NONTOXIC MULTINODULAR GOITER: ICD-10-CM

## 2025-01-28 DIAGNOSIS — E11.9 TYPE 2 DIABETES MELLITUS W/OUT COMPLICATIONS: ICD-10-CM

## 2025-01-28 PROCEDURE — 99214 OFFICE O/P EST MOD 30 MIN: CPT

## 2025-01-28 PROCEDURE — G2211 COMPLEX E/M VISIT ADD ON: CPT | Mod: NC

## 2025-01-29 PROBLEM — E11.9 NEW ONSET TYPE 2 DIABETES MELLITUS: Status: ACTIVE | Noted: 2025-01-28

## 2025-02-10 ENCOUNTER — APPOINTMENT (OUTPATIENT)
Dept: ENDOCRINOLOGY | Facility: CLINIC | Age: 60
End: 2025-02-10

## 2025-02-19 ENCOUNTER — APPOINTMENT (OUTPATIENT)
Dept: ENDOCRINOLOGY | Facility: CLINIC | Age: 60
End: 2025-02-19
Payer: COMMERCIAL

## 2025-02-19 DIAGNOSIS — E04.2 NONTOXIC MULTINODULAR GOITER: ICD-10-CM

## 2025-02-19 DIAGNOSIS — E11.9 TYPE 2 DIABETES MELLITUS W/OUT COMPLICATIONS: ICD-10-CM

## 2025-02-19 PROCEDURE — 99213 OFFICE O/P EST LOW 20 MIN: CPT | Mod: 95

## 2025-02-21 ENCOUNTER — APPOINTMENT (OUTPATIENT)
Dept: INTERNAL MEDICINE | Facility: CLINIC | Age: 60
End: 2025-02-21

## 2025-02-21 VITALS — SYSTOLIC BLOOD PRESSURE: 128 MMHG | DIASTOLIC BLOOD PRESSURE: 83 MMHG

## 2025-02-21 DIAGNOSIS — I10 ESSENTIAL (PRIMARY) HYPERTENSION: ICD-10-CM

## 2025-02-21 DIAGNOSIS — E78.5 HYPERLIPIDEMIA, UNSPECIFIED: ICD-10-CM

## 2025-02-21 DIAGNOSIS — R80.9 PROTEINURIA, UNSPECIFIED: ICD-10-CM

## 2025-02-21 PROCEDURE — G2211 COMPLEX E/M VISIT ADD ON: CPT | Mod: NC,95

## 2025-02-21 PROCEDURE — 99214 OFFICE O/P EST MOD 30 MIN: CPT | Mod: 95

## 2025-02-21 RX ORDER — ATORVASTATIN CALCIUM 40 MG/1
40 TABLET, FILM COATED ORAL
Qty: 1 | Refills: 1 | Status: ACTIVE | COMMUNITY
Start: 2025-02-19 | End: 1900-01-01

## 2025-03-07 ENCOUNTER — APPOINTMENT (OUTPATIENT)
Dept: ORTHOPEDIC SURGERY | Facility: CLINIC | Age: 60
End: 2025-03-07

## 2025-03-21 ENCOUNTER — APPOINTMENT (OUTPATIENT)
Dept: DERMATOLOGY | Facility: CLINIC | Age: 60
End: 2025-03-21

## 2025-05-21 ENCOUNTER — APPOINTMENT (OUTPATIENT)
Dept: OPHTHALMOLOGY | Facility: CLINIC | Age: 60
End: 2025-05-21
Payer: COMMERCIAL

## 2025-05-21 ENCOUNTER — NON-APPOINTMENT (OUTPATIENT)
Age: 60
End: 2025-05-21

## 2025-05-21 PROCEDURE — 92133 CPTRZD OPH DX IMG PST SGM ON: CPT

## 2025-05-21 PROCEDURE — 92014 COMPRE OPH EXAM EST PT 1/>: CPT

## 2025-05-22 ENCOUNTER — APPOINTMENT (OUTPATIENT)
Dept: OPHTHALMOLOGY | Facility: CLINIC | Age: 60
End: 2025-05-22

## 2025-05-28 ENCOUNTER — APPOINTMENT (OUTPATIENT)
Dept: HEART AND VASCULAR | Facility: CLINIC | Age: 60
End: 2025-05-28

## 2025-07-10 ENCOUNTER — APPOINTMENT (OUTPATIENT)
Dept: OTOLARYNGOLOGY | Facility: CLINIC | Age: 60
End: 2025-07-10
Payer: COMMERCIAL

## 2025-07-10 VITALS
OXYGEN SATURATION: 99 % | DIASTOLIC BLOOD PRESSURE: 73 MMHG | BODY MASS INDEX: 25.23 KG/M2 | TEMPERATURE: 98.3 F | WEIGHT: 157 LBS | HEIGHT: 66 IN | SYSTOLIC BLOOD PRESSURE: 161 MMHG | HEART RATE: 68 BPM

## 2025-07-10 PROBLEM — H90.3 BILATERAL SENSORINEURAL HEARING LOSS: Status: ACTIVE | Noted: 2025-07-10

## 2025-07-10 PROCEDURE — 99213 OFFICE O/P EST LOW 20 MIN: CPT | Mod: 25

## 2025-07-10 PROCEDURE — 92557 COMPREHENSIVE HEARING TEST: CPT

## 2025-07-10 PROCEDURE — 92567 TYMPANOMETRY: CPT

## 2025-07-10 PROCEDURE — G0268 REMOVAL OF IMPACTED WAX MD: CPT

## 2025-07-14 ENCOUNTER — APPOINTMENT (OUTPATIENT)
Dept: ENDOCRINOLOGY | Facility: CLINIC | Age: 60
End: 2025-07-14

## 2025-09-02 ENCOUNTER — APPOINTMENT (OUTPATIENT)
Dept: INTERNAL MEDICINE | Facility: CLINIC | Age: 60
End: 2025-09-02
Payer: COMMERCIAL

## 2025-09-02 VITALS
WEIGHT: 149.31 LBS | HEIGHT: 66 IN | BODY MASS INDEX: 24 KG/M2 | TEMPERATURE: 98.4 F | OXYGEN SATURATION: 99 % | SYSTOLIC BLOOD PRESSURE: 150 MMHG | HEART RATE: 67 BPM | DIASTOLIC BLOOD PRESSURE: 72 MMHG

## 2025-09-02 DIAGNOSIS — I10 ESSENTIAL (PRIMARY) HYPERTENSION: ICD-10-CM

## 2025-09-02 DIAGNOSIS — R59.0 LOCALIZED ENLARGED LYMPH NODES: ICD-10-CM

## 2025-09-02 DIAGNOSIS — E04.1 NONTOXIC SINGLE THYROID NODULE: ICD-10-CM

## 2025-09-02 DIAGNOSIS — M54.2 CERVICALGIA: ICD-10-CM

## 2025-09-02 PROCEDURE — 99214 OFFICE O/P EST MOD 30 MIN: CPT

## 2025-09-02 PROCEDURE — G2211 COMPLEX E/M VISIT ADD ON: CPT | Mod: NC

## 2025-09-04 ENCOUNTER — TRANSCRIPTION ENCOUNTER (OUTPATIENT)
Age: 60
End: 2025-09-04

## 2025-09-05 ENCOUNTER — APPOINTMENT (OUTPATIENT)
Dept: OTOLARYNGOLOGY | Facility: CLINIC | Age: 60
End: 2025-09-05
Payer: COMMERCIAL

## 2025-09-05 VITALS
DIASTOLIC BLOOD PRESSURE: 77 MMHG | OXYGEN SATURATION: 99 % | TEMPERATURE: 98 F | BODY MASS INDEX: 24.11 KG/M2 | HEART RATE: 68 BPM | SYSTOLIC BLOOD PRESSURE: 168 MMHG | HEIGHT: 66 IN | WEIGHT: 150 LBS

## 2025-09-05 DIAGNOSIS — R20.2 PARESTHESIA OF SKIN: ICD-10-CM

## 2025-09-05 DIAGNOSIS — M25.78 OSTEOPHYTE, VERTEBRAE: ICD-10-CM

## 2025-09-05 DIAGNOSIS — J04.0 ACUTE LARYNGITIS: ICD-10-CM

## 2025-09-05 DIAGNOSIS — D44.0 NEOPLASM OF UNCERTAIN BEHAVIOR OF THYROID GLAND: ICD-10-CM

## 2025-09-05 DIAGNOSIS — E04.2 NONTOXIC MULTINODULAR GOITER: ICD-10-CM

## 2025-09-05 DIAGNOSIS — J35.9 CHRONIC DISEASE OF TONSILS AND ADENOIDS, UNSPECIFIED: ICD-10-CM

## 2025-09-05 DIAGNOSIS — M54.2 CERVICALGIA: ICD-10-CM

## 2025-09-05 DIAGNOSIS — K21.9 ACUTE LARYNGITIS: ICD-10-CM

## 2025-09-05 PROCEDURE — 10005 FNA BX W/US GDN 1ST LES: CPT

## 2025-09-05 PROCEDURE — 99215 OFFICE O/P EST HI 40 MIN: CPT | Mod: 25

## 2025-09-05 PROCEDURE — 31575 DIAGNOSTIC LARYNGOSCOPY: CPT

## 2025-09-11 ENCOUNTER — APPOINTMENT (OUTPATIENT)
Dept: HEART AND VASCULAR | Facility: CLINIC | Age: 60
End: 2025-09-11
Payer: COMMERCIAL

## 2025-09-11 VITALS
WEIGHT: 151 LBS | SYSTOLIC BLOOD PRESSURE: 172 MMHG | OXYGEN SATURATION: 97 % | HEART RATE: 67 BPM | BODY MASS INDEX: 24.27 KG/M2 | HEIGHT: 66 IN | DIASTOLIC BLOOD PRESSURE: 82 MMHG

## 2025-09-11 DIAGNOSIS — E78.5 HYPERLIPIDEMIA, UNSPECIFIED: ICD-10-CM

## 2025-09-11 DIAGNOSIS — I10 ESSENTIAL (PRIMARY) HYPERTENSION: ICD-10-CM

## 2025-09-11 PROCEDURE — 93000 ELECTROCARDIOGRAM COMPLETE: CPT

## 2025-09-11 PROCEDURE — 99213 OFFICE O/P EST LOW 20 MIN: CPT | Mod: 25

## 2025-09-13 ENCOUNTER — APPOINTMENT (OUTPATIENT)
Dept: CT IMAGING | Facility: HOSPITAL | Age: 60
End: 2025-09-13
Payer: COMMERCIAL

## 2025-09-13 PROCEDURE — 70490 CT SOFT TISSUE NECK W/O DYE: CPT | Mod: 26

## 2025-09-17 ENCOUNTER — EMERGENCY (EMERGENCY)
Facility: HOSPITAL | Age: 60
LOS: 1 days | End: 2025-09-17
Attending: EMERGENCY MEDICINE
Payer: COMMERCIAL

## 2025-09-17 VITALS
DIASTOLIC BLOOD PRESSURE: 87 MMHG | TEMPERATURE: 98 F | RESPIRATION RATE: 16 BRPM | SYSTOLIC BLOOD PRESSURE: 170 MMHG | OXYGEN SATURATION: 99 % | HEART RATE: 60 BPM

## 2025-09-17 VITALS
TEMPERATURE: 98 F | SYSTOLIC BLOOD PRESSURE: 205 MMHG | RESPIRATION RATE: 16 BRPM | WEIGHT: 149.91 LBS | OXYGEN SATURATION: 98 % | HEART RATE: 79 BPM | DIASTOLIC BLOOD PRESSURE: 104 MMHG

## 2025-09-17 DIAGNOSIS — Z98.89 OTHER SPECIFIED POSTPROCEDURAL STATES: Chronic | ICD-10-CM

## 2025-09-17 PROCEDURE — 99284 EMERGENCY DEPT VISIT MOD MDM: CPT | Mod: 25

## 2025-09-17 PROCEDURE — 93005 ELECTROCARDIOGRAM TRACING: CPT

## 2025-09-17 PROCEDURE — 70490 CT SOFT TISSUE NECK W/O DYE: CPT | Mod: 26

## 2025-09-17 PROCEDURE — 99284 EMERGENCY DEPT VISIT MOD MDM: CPT

## 2025-09-17 PROCEDURE — 70490 CT SOFT TISSUE NECK W/O DYE: CPT

## 2025-09-26 PROBLEM — M54.6 THORACIC BACK PAIN: Status: ACTIVE | Noted: 2025-09-26
